# Patient Record
Sex: MALE | Race: WHITE | NOT HISPANIC OR LATINO | Employment: FULL TIME | URBAN - METROPOLITAN AREA
[De-identification: names, ages, dates, MRNs, and addresses within clinical notes are randomized per-mention and may not be internally consistent; named-entity substitution may affect disease eponyms.]

---

## 2019-01-21 ENCOUNTER — TELEPHONE (OUTPATIENT)
Dept: SCHEDULING | Facility: IMAGING CENTER | Age: 24
End: 2019-01-21

## 2019-01-22 ENCOUNTER — OFFICE VISIT (OUTPATIENT)
Dept: MEDICAL GROUP | Age: 24
End: 2019-01-22
Payer: COMMERCIAL

## 2019-01-22 VITALS
BODY MASS INDEX: 25.87 KG/M2 | DIASTOLIC BLOOD PRESSURE: 80 MMHG | HEIGHT: 73 IN | SYSTOLIC BLOOD PRESSURE: 130 MMHG | TEMPERATURE: 97.4 F | WEIGHT: 195.2 LBS | HEART RATE: 67 BPM | OXYGEN SATURATION: 96 %

## 2019-01-22 DIAGNOSIS — Z23 NEED FOR VACCINATION: ICD-10-CM

## 2019-01-22 DIAGNOSIS — J45.20 MILD INTERMITTENT ASTHMA WITHOUT COMPLICATION: ICD-10-CM

## 2019-01-22 DIAGNOSIS — Z00.00 PE (PHYSICAL EXAM), ANNUAL: Primary | ICD-10-CM

## 2019-01-22 DIAGNOSIS — Z91.89 AT RISK FOR HIV DUE TO HOMOSEXUAL CONTACT: ICD-10-CM

## 2019-01-22 DIAGNOSIS — H61.23 BILATERAL IMPACTED CERUMEN: ICD-10-CM

## 2019-01-22 PROCEDURE — 90632 HEPA VACCINE ADULT IM: CPT | Performed by: FAMILY MEDICINE

## 2019-01-22 PROCEDURE — 90686 IIV4 VACC NO PRSV 0.5 ML IM: CPT | Performed by: FAMILY MEDICINE

## 2019-01-22 PROCEDURE — 90472 IMMUNIZATION ADMIN EACH ADD: CPT | Performed by: FAMILY MEDICINE

## 2019-01-22 PROCEDURE — 90471 IMMUNIZATION ADMIN: CPT | Performed by: FAMILY MEDICINE

## 2019-01-22 PROCEDURE — 99395 PREV VISIT EST AGE 18-39: CPT | Mod: 25 | Performed by: FAMILY MEDICINE

## 2019-01-22 RX ORDER — EMTRICITABINE AND TENOFOVIR DISOPROXIL FUMARATE 200; 300 MG/1; MG/1
1 TABLET, FILM COATED ORAL DAILY
Qty: 90 TAB | Refills: 1 | Status: SHIPPED | OUTPATIENT
Start: 2019-01-22 | End: 2019-05-17 | Stop reason: SDUPTHER

## 2019-01-22 ASSESSMENT — PATIENT HEALTH QUESTIONNAIRE - PHQ9: CLINICAL INTERPRETATION OF PHQ2 SCORE: 0

## 2019-01-22 NOTE — PROGRESS NOTES
CC: Establish care, annual PE    HPI:     Marcos Feldman is a 23 y.o. male, new patient to the clinic and would like to establish care.     Patient is healthy, no major medical or surgical history.  Patient denies alcohol or drug use. Patient is sexually active with males and uses condoms for protection. He is requesting information on Truvada.    Patient reports history of mild intermittent asthma. He states he was diagnosed as a child and has not had a flare-up for several years. He has several inhalers at home, but does not know when they .    Patient is requesting the influenza and HepA vaccine.    Current medicines (including changes today)  Current Outpatient Prescriptions   Medication Sig Dispense Refill   • emtricitabine-tenofovir (TRUVADA) 200-300 MG per tablet Take 1 Tab by mouth every day. 90 Tab 1   • carbamide peroxide (CARBAMOXIDE EAR DROPS) 6.5 % Solution Place 6 Drops in ear 2 times a day. 1 Bottle 1   • ALBUTEROL INH        No current facility-administered medications for this visit.      He  has a past medical history of ASTHMA.  He  has a past surgical history that includes other orthopedic surgery (Right); incision and drainage orthopedic (4/15/08); incision and drainage orthopedic (08); irrigation & debridement ortho (08); pin insertion (08); and wound closure ortho (08).  Social History   Substance Use Topics   • Smoking status: Never Smoker   • Smokeless tobacco: Never Used   • Alcohol use No     Social History     Social History Narrative   • No narrative on file     Family History   Problem Relation Age of Onset   • Hypertension Mother    • Alcohol/Drug Mother    • Hypertension Father    • Alcohol/Drug Father    • Arthritis Father    • Arthritis Paternal Grandmother      No family status information on file.       I personally reviewed patient's problem list, allergies, medications, family hx, social hx with patient and update EPIC.     REVIEW OF  "SYSTEMS:  CONSTITUTIONAL:  Denies night sweats, fatigue, malaise, lethargy, fever or chills.  RESPIRATORY:  Denies cough, wheeze, hemoptysis, or shortness of breath.  CARDIOVASCULAR:  Denies chest pains, palpitations, pedal edema  GASTROINTESTINAL:  Denies abdominal pain, nausea or vomiting, diarrhea, constipation, hematemesis, hematochezia, melena.  GENITOURINARY:  Denies urinary urgency, frequency, dysuria, or hematuria.  No obstructive symptoms.  Denies unusual discharge.    All other systems reviewed and are negative     Objective:     Blood pressure 130/80, pulse 67, temperature 36.3 °C (97.4 °F), temperature source Temporal, height 1.854 m (6' 1\"), weight 88.5 kg (195 lb 3.2 oz), SpO2 96 %. Body mass index is 25.75 kg/m².  Physical Exam:    Constitutional: Awake, alert, in no apparent distress.  Skin: Warm, dry, good turgor, no rashes/jaundice in visible areas.  ENMT: Moderate bilateral cerumen impaction, nasal & oral mucosa wnl, lips without lesions, good dentition, oropharynx clear.  Neck: Trachea midline, no masses, no thyromegaly. No cervical or supraclavicular lymphadenopathy.  Respiratory: Unlabored respiratory effort, lungs clear to auscultation, no wheezes, no rales.  Cardiovascular: Normal S1, S2, no murmur, no rubs, no gallops, no pedal edema.  Psych: Alert and oriented x3, affect and mood wnl, intact judgement and insight.       Assessment and Plan:   The following treatment plan was discussed    1. Mild intermittent asthma without complication  Chronic, diagnosed during childhood, under excellent control with albuterol inhaler as needed.  Patient denies any active respiratory symptoms today.  -Continue albuterol as needed for shortness of breath    2. At risk for HIV due to homosexual contact  Patient is healthy, sexually active, homosexual.  Patient was recently tested negative for HIV, syphilis, chlamydia, gonorrhea.  Patient does not have stable sex partner at this time.  He request Truvada for " HIV prophylaxis.  Plans:  - emtricitabine-tenofovir (TRUVADA) 200-300 MG per tablet; Take 1 Tab by mouth every day.  Dispense: 90 Tab; Refill: 1  -Side effects and the need routine monitoring discussed with patient  - COMP METABOLIC PANEL; Future  - CBC WITH DIFFERENTIAL; Future  - HIV AG/AB COMBO ASSAY SCREENING; Future  - T.PALLIDUM AB EIA; Future  - CHLAMYDIA/GC PCR URINE OR SWAB; Future  -Safe sex counseling provided  -Follow-up in 3 months    3. Bilateral impacted cerumen  Exam was notable for moderate bilateral cerumen impaction.  Patient declined ear lavage.  He denies any problems with hearing.  Plans:  - carbamide peroxide (CARBAMOXIDE EAR DROPS) 6.5 % Solution; Place 6 Drops in ear 2 times a day.  Dispense: 1 Bottle; Refill: 1    4. Need for vaccination  - Influenza Vaccine Quad Injection >3Y (PF)  - Hepatitis A Vaccine Adult IM    5. Annual PE   - pt is counseled on diet, exercise, vitamin supplement, mental health, sleep, stress  - discussed preventive cares and vaccine recommendations.      Viri Hong M.D.    Records requested.  Followup: Return in about 3 months (around 4/22/2019) for Prep therapy monitor.    Please note that this dictation was created using voice recognition software and/or scribes. I have made every reasonable attempt to correct obvious errors, but I expect that there are errors of grammar and possibly content that I did not discover before finalizing the note.     Andre WALLACE (Arturo), am scribing for, and in the presence of, Viri Hong M.D.    Electronically signed by: Andre Barnes (Arturo), 1/22/2019    Viri WALLACE M.D. personally performed the services described in this documentation, as scribed by Andre Barnes in my presence, and it is both accurate and complete.

## 2019-01-25 ENCOUNTER — TELEPHONE (OUTPATIENT)
Dept: MEDICAL GROUP | Age: 24
End: 2019-01-25

## 2019-01-26 NOTE — TELEPHONE ENCOUNTER
Phone Number Called: [facility] pharmacy    Message: called in pt medication to [facility] [place], [place]    Left Message for patient to call back: no

## 2019-05-07 ENCOUNTER — TELEPHONE (OUTPATIENT)
Dept: MEDICAL GROUP | Age: 24
End: 2019-05-07

## 2019-05-07 NOTE — TELEPHONE ENCOUNTER
"Phone Number Called: [phone] (home)       Message: Left VM for Pt to contact office.     Pt needs to be notified of provider's note:  \"Please advise pt that he was tested negative for HIV, syphilis, chlamydia, gonorrhea.\"    Left Message for patient to call back: yes        "

## 2019-05-07 NOTE — TELEPHONE ENCOUNTER
Phone Number Called: [phone] (home)       Message: informed patient    Left Message for patient to call back: no

## 2019-05-17 ENCOUNTER — OFFICE VISIT (OUTPATIENT)
Dept: MEDICAL GROUP | Age: 24
End: 2019-05-17
Payer: COMMERCIAL

## 2019-05-17 VITALS
HEIGHT: 73 IN | DIASTOLIC BLOOD PRESSURE: 66 MMHG | HEART RATE: 54 BPM | TEMPERATURE: 98.4 F | SYSTOLIC BLOOD PRESSURE: 112 MMHG | OXYGEN SATURATION: 95 % | BODY MASS INDEX: 25.18 KG/M2 | WEIGHT: 190 LBS

## 2019-05-17 DIAGNOSIS — Z91.89 AT RISK FOR HIV DUE TO HOMOSEXUAL CONTACT: Primary | ICD-10-CM

## 2019-05-17 DIAGNOSIS — J45.20 MILD INTERMITTENT ASTHMA WITHOUT COMPLICATION: ICD-10-CM

## 2019-05-17 DIAGNOSIS — H61.23 BILATERAL IMPACTED CERUMEN: ICD-10-CM

## 2019-05-17 DIAGNOSIS — Z23 NEED FOR VACCINATION: ICD-10-CM

## 2019-05-17 PROCEDURE — 99214 OFFICE O/P EST MOD 30 MIN: CPT | Mod: 25 | Performed by: FAMILY MEDICINE

## 2019-05-17 PROCEDURE — 90471 IMMUNIZATION ADMIN: CPT | Performed by: FAMILY MEDICINE

## 2019-05-17 PROCEDURE — 90732 PPSV23 VACC 2 YRS+ SUBQ/IM: CPT | Performed by: FAMILY MEDICINE

## 2019-05-17 RX ORDER — ALBUTEROL SULFATE 90 UG/1
2 AEROSOL, METERED RESPIRATORY (INHALATION) EVERY 6 HOURS PRN
Qty: 8.5 G | Refills: 5 | Status: ON HOLD | OUTPATIENT
Start: 2019-05-17 | End: 2023-03-07

## 2019-05-17 RX ORDER — EMTRICITABINE AND TENOFOVIR DISOPROXIL FUMARATE 200; 300 MG/1; MG/1
1 TABLET, FILM COATED ORAL DAILY
Qty: 90 TAB | Refills: 1 | Status: SHIPPED | OUTPATIENT
Start: 2019-05-17 | End: 2019-11-22 | Stop reason: SDUPTHER

## 2019-05-17 NOTE — PROGRESS NOTES
Subjective:   CC: Truvada med review     HPI:     Marcos Feldman is a 24 y.o. male who is an established patient of the clinic, presents with the following concerns:     Overall, the patient is in good health with no acute complaints or concerns. He was started on Truvada during his last office visit on 01/22/19. He is sexually active with a male partner and wants to be on Truvada for HIV Prep therapy. He is tolerating the medication well and denies any acute complaints. He presents today to review the results of his most recent labs dated 01/16/19. Kidney and liver function are normal. He tested negative for HIV, syphilis, chlamydia and gonorrhea. No acute complaints of penile pain, penile discharge or urinary symptoms.    During his last office visit, he was found to have moderate bilateral cerumen impaction. He has applied Carbamide peroxide solution intermittently. He denies any ear pain, ear fullness or decreased hearing.    Prior history of asthma which was diagnosed during childhood. Asthma is under excellent control with an Albuterol inhaler as needed. He denies any active respiratory symptoms today. He does require a refill of the inhaler.    Health maintenance reviewed. He is due for the pneumonia vaccine.       Current medicines (including changes today)  Current Outpatient Prescriptions   Medication Sig Dispense Refill   • emtricitabine-tenofovir (TRUVADA) 200-300 MG per tablet Take 1 Tab by mouth every day. 90 Tab 1   • albuterol (VENTOLIN HFA) 108 (90 Base) MCG/ACT Aero Soln inhalation aerosol Inhale 2 Puffs by mouth every 6 hours as needed for Shortness of Breath. 8.5 g 5     No current facility-administered medications for this visit.      He  has a past medical history of ASTHMA.    I personally reviewed patient's problem list, allergies, medications, family hx, social hx with patient and update EPIC.     REVIEW OF SYSTEMS:  CONSTITUTIONAL:  Denies night sweats, fatigue, malaise, lethargy, fever  "or chills.  RESPIRATORY:  Denies cough, wheeze, hemoptysis or shortness of breath.  CARDIOVASCULAR:  Denies chest pains, palpitations or pedal edema.  : Denies penile pain, penile discharge or urinary symptoms.    See HPI for further details.         Objective:     /66 (BP Location: Right arm, Patient Position: Sitting, BP Cuff Size: Adult)   Pulse (!) 54   Temp 36.9 °C (98.4 °F) (Temporal)   Ht 1.854 m (6' 1\")   Wt 86.2 kg (190 lb)   SpO2 95%  Body mass index is 25.07 kg/m².    Physical Exam:  Constitutional: awake, alert, in no distress.  Skin: Warm, dry, good turgor, no rashes, bruises, ulcers in visible areas.  Eye: conjunctiva clear, lids neg for edema or lesions.  ENMT: moderate cerumen impaction bilaterally. Oral and nasal mucosa wnl. Lips without lesions, good dentition, oropharynx clear.  Neck: Trachea midline, no masses, no thyromegaly. No cervical or supraclavicular lymphadenopathy  Respiratory: Unlabored respiratory effort, lungs clear to auscultation, no wheezes, no rales.  Cardiovascular: Normal S1, S2, no murmur, no pedal edema.  Psych: Oriented x3, affect and mood wnl, intact judgement and insight.       Assessment and Plan:   The following treatment plan was discussed:    1. Need for vaccination  - Pneumococal Polysaccharide Vaccine 23-Valent =>1yo SQ/IM    2. At risk for HIV due to homosexual contact  - emtricitabine-tenofovir (TRUVADA) 200-300 MG per tablet; Take 1 Tab by mouth every day.  Dispense: 90 Tab; Refill: 1  - HIV AG/AB COMBO ASSAY SCREENING; Future  - T.PALLIDUM AB EIA; Future  - Chlamydia/GC PCR Urine Or Swab; Future  - CBC WITH DIFFERENTIAL; Future  - Comp Metabolic Panel; Future    3. Bilateral impacted cerumen  - ear lavage by MA    4. Mild intermittent asthma without complication  Chronic, controlled with albuterol PRN, will continue   - albuterol (VENTOLIN HFA) 108 (90 Base) MCG/ACT Aero Soln inhalation aerosol; Inhale 2 Puffs by mouth every 6 hours as needed for " Shortness of Breath.  Dispense: 8.5 g; Refill: 5     Viri Hong M.D.    Follow up: Return in about 6 months (around 11/17/2019) for Multiple issues.    Please note that this dictation was created using voice recognition software and/or scribes. I have made every reasonable attempt to correct obvious errors, but I expect that there are errors of grammar and possibly content that I did not discover before finalizing the note.     IJyotsna (Scribe), am scribing for, and in the presence of, Viri Hong M.D.    Electronically signed by: Jyotsna Miller (Scribe), 5/17/2019    Viri WALLACE M.D. personally performed the services described in this documentation, as scribed by Jyotsna Miller in my presence, and it is both accurate and complete.

## 2019-05-23 ENCOUNTER — OCCUPATIONAL MEDICINE (OUTPATIENT)
Dept: OCCUPATIONAL MEDICINE | Facility: CLINIC | Age: 24
End: 2019-05-23
Payer: COMMERCIAL

## 2019-05-23 VITALS
TEMPERATURE: 97.6 F | BODY MASS INDEX: 25.18 KG/M2 | WEIGHT: 190 LBS | HEART RATE: 52 BPM | OXYGEN SATURATION: 100 % | HEIGHT: 73 IN | SYSTOLIC BLOOD PRESSURE: 120 MMHG | RESPIRATION RATE: 12 BRPM | DIASTOLIC BLOOD PRESSURE: 70 MMHG

## 2019-05-23 DIAGNOSIS — Z02.1 PRE-EMPLOYMENT DRUG SCREENING: ICD-10-CM

## 2019-05-23 DIAGNOSIS — Z77.098 EXPOSURE TO HAZARDOUS CHEMICAL: Primary | ICD-10-CM

## 2019-05-23 LAB
AMP AMPHETAMINE: NORMAL
BAR BARBITURATES: NORMAL
BZO BENZODIAZEPINES: NORMAL
COC COCAINE: NORMAL
INT CON NEG: NORMAL
INT CON POS: NORMAL
MDMA ECSTASY: NORMAL
MET METHAMPHETAMINES: NORMAL
MTD METHADONE: NORMAL
OPI OPIATES: NORMAL
OXY OXYCODONE: NORMAL
PCP PHENCYCLIDINE: NORMAL
POC URINE DRUG SCREEN OCDRS: NORMAL
THC: NORMAL

## 2019-05-23 PROCEDURE — 80305 DRUG TEST PRSMV DIR OPT OBS: CPT | Performed by: PREVENTIVE MEDICINE

## 2019-05-23 PROCEDURE — 99202 OFFICE O/P NEW SF 15 MIN: CPT | Performed by: PREVENTIVE MEDICINE

## 2019-05-23 ASSESSMENT — PAIN SCALES - GENERAL: PAINLEVEL: 4=SLIGHT-MODERATE PAIN

## 2019-05-23 NOTE — LETTER
[address],   [address] [place] [address]  Phone:  [phone] - Fax:  [phone]   Occupational Health [facility] Progress Report and Disability Certification  Date of Service: 5/23/2019   No Show:  No  Date / Time of Next Visit:  Release from care   Claim Information   Patient Name: Marcos Feldman  Claim Number:     Employer: [employer]  Date of Injury: 5/22/2019     Insurer / TPA: Fawn Goodwin  ID / SSN:     Occupation:   Diagnosis: The primary encounter diagnosis was Exposure to hazardous chemical. A diagnosis of Pre-employment drug screening was also pertinent to this visit.    Medical Information   Related to Industrial Injury?   Comments:Indeterminant    Subjective Complaints:  DOI 5/22/2019: 23 yo male presents with chemical exposure to the mouth.  Patient states he was at work and later on the day noted swelling and blistering of the tongue.  He notes some stinging sensation.  He went home and took Benadryl and ibuprofen.  The swelling and pain has reduced but has not subsided completely.  He denies history of allergies, eczema.  Denies shortness of breath, chest pain, sore throat, other symptoms.  He states he did touch some epoxy at work and is unsure if he may have touched his mouth afterward. He states he reported his symptoms at work and they advised him to present to the clinic.   Objective Findings: HEENT: Few diffuse small vesicles on the tongue, no erythema or swelling noted.  No tenderness.  No open wounds or lesions.   Pre-Existing Condition(s):     Assessment:   Initial Visit    Status: Discharged /  MMI  Permanent Disability:No    Plan:      Diagnostics:      Comments:  At this point hard to pinpoint exact cause of this patient's symptoms.  Epoxies are known to cause allergic reactions but has easily could have been other exposures.  With symptoms improving at this point no need for further  intervention  Released [facility] care  Full duty  Follow-up as needed    Disability Information   Status: Released to Full Duty    From:  5/23/2019  Through:   Restrictions are:     Physical Restrictions   Sitting:    Standing:    Stooping:    Bending:      Squatting:    Walking:    Climbing:    Pushing:      Pulling:    Other:    Reaching Above Shoulder (L):   Reaching Above Shoulder (R):       Reaching Below Shoulder (L):    Reaching Below Shoulder (R):      Not to exceed Weight Limits   Carrying(hrs):   Weight Limit(lb):   Lifting(hrs):   Weight  Limit(lb):     Comments:      Repetitive Actions   Hands: i.e. Fine Manipulations from Grasping:     Feet: i.e. Operating Foot Controls:     Driving / Operate Machinery:     Physician Name: Gerry Peters D.O. Physician Signature: GERRY Fang D.O. e-Signature: Dr. Yaron Tan, Medical Director   Clinic Name / Location: [address],   [address] [place] [id number] Clinic Phone Number: Dept: [phone]   Appointment Time: 4:15 Pm Visit Start Time: 2:49 PM   Check-In Time:  2:39 Pm Visit Discharge Time: 3:23 PM   Original-Treating Physician or Chiropractor    Page 2-Insurer/TPA    Page 3-Employer    Page 4-Employee

## 2019-05-23 NOTE — PROGRESS NOTES
"Subjective:      Marcso Feldman is a 24 y.o. male who presents with Other (NEW WC DOI 05/22/2019 - Chemical Exp/Reaction - RM 16)      DOI 5/22/2019: 23 yo male presents with chemical exposure to the mouth.  Patient states he was at work and later on the day noted swelling and blistering of the tongue.  He notes some stinging sensation.  He went home and took Benadryl and ibuprofen.  The swelling and pain has reduced but has not subsided completely.  He denies history of allergies, eczema.  Denies shortness of breath, chest pain, sore throat, other symptoms.  He states he did touch some epoxy at work and is unsure if he may have touched his mouth afterward. He states he reported his symptoms at work and they advised him to present to the clinic.     HPI    ROS  ROS: All systems were reviewed on intake form, form was reviewed and signed. See scanned documents in media. Pertinent positives and negatives included in HPI.    PMH: No pertinent past medical history to this problem  MEDS: Medications were reviewed in Epic  ALLERGIES:   Allergies   Allergen Reactions   • No Known Drug Allergy      SOCHX: Works as a  at [employer]  FH: No pertinent family history to this problem     Objective:     /70   Pulse (!) 52   Temp 36.4 °C (97.6 °F) (Temporal)   Resp 12   Ht 1.854 m (6' 1\")   Wt 86.2 kg (190 lb)   SpO2 100%   BMI 25.07 kg/m²      Physical Exam   Constitutional: He is oriented to person, place, and time. He appears well-developed and well-nourished.   Cardiovascular: Normal rate.    Pulmonary/Chest: Effort normal.   Neurological: He is alert and oriented to person, place, and time.   Skin: Skin is warm and dry.   Psychiatric: He has a normal mood and affect. His behavior is normal. Judgment normal.       HEENT: Few diffuse small vesicles on the tongue, no erythema or swelling noted.  No tenderness.  No open wounds or lesions.       Assessment/Plan:     1. Exposure to hazardous " chemical      At this point hard to pinpoint exact cause of this patient's symptoms.  Epoxies are known to cause allergic reactions but has easily could have been other exposures.   With symptoms improving at this point no need for further intervention   Released from care   Full duty   Follow-up as needed

## 2019-05-23 NOTE — LETTER
"EMPLOYEE’S CLAIM FOR COMPENSATION/ REPORT OF INITIAL TREATMENT  FORM C-4    EMPLOYEE’S CLAIM - PROVIDE ALL INFORMATION REQUESTED   First Name  Marcos Last Name  Gaston Birthdate                    1995                Sex  male Claim Number   Home Address  [address] Age  24 y.o. Height  1.854 m (6' 1\") Weight  86.2 kg (190 lb) [facility]     [facility] [place]  [address] Telephone  [phone] (home)    Mailing Address  [address] [facility] Zip  [address] Primary Language Spoken  English    Insurer  UNKNOWN Third Party   Fawn Goodwin   Employee's Occupation (Job Title) When Injury or Occupational Disease Occurred      Employer's Name  [employer]  Telephone  [phone]    Employer Address  [address]  [facility]  [address]    Date of Injury  5/22/2019               Hour of Injury  11:45 AM Date Employer Notified  5/23/2019 Last Day of Work after Injury or Occupational Disease  5/23/2019 Supervisor to Whom Injury Reported  SAMIR CUNNINGHAM   Address or Location of Accident (if applicable)  [[address] [place] [address]]   What were you doing at the time of accident? (if applicable)  Dropping off supplies    How did this injury or occupational disease occur? (Be specific an answer in detail. Use additional sheet if necessary)  I Believe I touched an epoxt floor, and the touched my mouth   If you believe that you have an occupational disease, when did you first have knowledge of the disability and it relationship to your employment?  N/A Witnesses to the Accident  None      Nature of Injury or Occupational Disease  Poisoning - Chemical (Other than metals)  Part(s) of Body Injured or Affected  Mouth, ,     I certify that the above is true and correct to the best of my knowledge and that I have provided this information in order to obtain the " benefits of [place]  I hereby authorize any physician, chiropractor, surgeon, practitioner, or other person, any hospital, including [facility] or [facility], any medical service organization, any insurance company, or other institution or organization to release to each other, any medical or other information, including benefits paid or payable, pertinent to this injury or disease, except information relative to diagnosis, treatment and/or counseling for AIDS, psychological conditions, alcohol or controlled substances, for which I must give specific authorization.  A Photostat of this authorization shall be as valid as the original.     Date   Place   Employee’s Signature   THIS REPORT MUST BE COMPLETED AND MAILED WITHIN 3 WORKING DAYS OF TREATMENT   Place  [facility]  Name of [facility]   Date  5/23/2019 Diagnosis  (Z77.098) Exposure to hazardous chemical  (primary encounter diagnosis)  (Z02.1) Pre-employment drug screening Is there evidence the injured employee was under the influence of alcohol and/or another controlled substance at the time of accident?   Hour  2:49 PM Description of Injury or Disease  The primary encounter diagnosis was Exposure to hazardous chemical. A diagnosis of Pre-employment drug screening was also pertinent to this visit. No   Treatment  OTC ibuprofen  Have you advised the patient to remain off work five days or more? No   X-Ray Findings      If Yes   From Date  To Date      From information given by the employee, together with medical evidence, can you directly connect this injury or occupational disease as job incurred?    Comments:Indeterminate If No Full Duty  Yes Modified Duty      Is additional medical care by a physician indicated?  No If Modified Duty, Specify any Limitations / Restrictions      Do you know of any previous  "injury or disease contributing to this condition or occupational disease?                                Date  5/23/2019 Print Doctor’s Name Gerry Peters D.O. I certify the employer’s copy of  this form was mailed on:   Address  [address],   [address] Insurer’s Use Only     [facility] Zip  [address]    Provider’s Tax ID Number  [id number] Telephone  Dept: [phone]        e-SignTAYLOR, GERRY AGUIRRE D.O.   e-Signature: Dr. Yaron Tan, Medical Director Degree  DO        ORIGINAL-TREATING PHYSICIAN OR CHIROPRACTOR    PAGE 2-INSURER/TPA    PAGE 3-EMPLOYER    PAGE 4-EMPLOYEE             Form C-4 (rev10/07)              BRIEF DESCRIPTION OF RIGHTS AND BENEFITS  (Pursuant to [place] 616C.050)    Notice of Injury or Occupational Disease (Incident Report Form C-1): If an injury or occupational disease (OD) arises out of and in the  course of employment, you must provide written notice to your employer as soon as practicable, but no later than 7 days after the accident or  OD. Your employer shall maintain a sufficient supply of the required forms.    Claim for Compensation (Form C-4): If medical treatment is sought, the form C-4 is available at the place of initial treatment. A completed  \"Claim for Compensation\" (Form C-4) must be filed within 90 days after an accident or OD. The treating physician or chiropractor must,  within 3 working days after treatment, complete and mail to the employer, the employer's insurer and third-party , the Claim for  Compensation.    Medical Treatment: If you require medical treatment for your on-the-job injury or OD, you may be required to select a physician or  chiropractor from a list provided by your workers’ compensation insurer, if it has contracted with an Organization for Managed Care (MCO) or  Preferred Provider Organization (PPO) or providers of health care. If your employer has not entered into a contract with an MCO or PPO, you  may select a physician or " chiropractor from the Panel of Physicians and Chiropractors. Any medical costs related to your industrial injury or  OD will be paid by your insurer.    Temporary Total Disability (TTD): If your doctor has certified that you are unable to work for a period of at least 5 consecutive days, or 5  cumulative days in a 20-day period, or places restrictions on you that your employer does not accommodate, you may be entitled to TTD  compensation.    Temporary Partial Disability (TPD): If the wage you receive upon reemployment is less than the compensation for TTD to which you are  entitled, the insurer may be required to pay you TPD compensation to make up the difference. TPD can only be paid for a maximum of 24  months.    Permanent Partial Disability (PPD): When your medical condition is stable and there is an indication of a PPD as a result of your injury or  OD, within 30 days, your insurer must arrange for an evaluation by a rating physician or chiropractor to determine the degree of your PPD. The  amount of your PPD award depends on the date of injury, the results of the PPD evaluation and your age and wage.    Permanent Total Disability (PTD): If you are medically certified by a treating physician or chiropractor as permanently and totally disabled  and have been granted a PTD status by your insurer, you are entitled to receive monthly benefits not to exceed 66 2/3% of your average  monthly wage. The amount of your PTD payments is subject to reduction if you previously received a PPD award.    Vocational Rehabilitation Services: You may be eligible for vocational rehabilitation services if you are unable to return to the job due to a  permanent physical impairment or permanent restrictions as a result of your injury or occupational disease.    Transportation and Per Silke Reimbursement: You may be eligible for travel expenses and per silke associated with medical treatment.    Reopening: You may be able to reopen your  claim if your condition worsens after claim closure.    Appeal Process: If you disagree with a written determination issued by the insurer or the insurer does not respond to your request, you may  appeal to the Department of Administration, , by following the instructions contained in your determination letter. You must  appeal the determination within 70 days from the date of the determination letter at [address], [address], [place], [place]  [address], or [address], [place], [place] [address]. If you disagree with the  decision, you may appeal to the  Department of Administration, . You must file your appeal within 30 days from the date of the  decision  letter at [address], [address], [place], [place] [address], or [address] [facility] [address], [place], [place] [address]. If you  disagree with a decision of an , you may file a petition for judicial review with the District Court. You must do so within 30  days of the Appeal Officer’s decision. You may be represented by an  at your own expense or you may contact the [facility] for possible  representation.    [place]  for Injured Workers (NAIW): If you disagree with a  decision, you may request that NAIW represent you  without charge at an  Hearing. For information regarding denial of benefits, you may contact the [facility] at: [address], [place] [address], [phone], or [address], [place] [address], [phone]    To File a Complaint with the Division: If you wish to file a complaint with the  of the Division of Industrial Relations (DIR),  please contact the Workers’ Compensation Section, [address], [address], [place], [place] [address], telephone [phone], or  [address], [place]  [address], telephone [phone].    For assistance with Workers’ Compensation Issues: you may contact the Office of the Governor Consumer Health Assistance, [address] [place] [address], [facility] [address], [place] [address], Toll Free 1-573.350.6926, Web site: http://[employer].[place]., E-mail  [email or web address].[place].                                                                                                                                                                                                                                   __________________________________________________________________                                                                   _________________                Employee Name / Signature                                                                                                                                                       Date                                                                                                                                                                                                     D-2 (rev. 10/07)

## 2019-11-12 DIAGNOSIS — Z91.89 AT RISK FOR HIV DUE TO HOMOSEXUAL CONTACT: ICD-10-CM

## 2019-11-22 ENCOUNTER — OFFICE VISIT (OUTPATIENT)
Dept: MEDICAL GROUP | Age: 24
End: 2019-11-22
Payer: COMMERCIAL

## 2019-11-22 VITALS
HEART RATE: 56 BPM | TEMPERATURE: 97.6 F | HEIGHT: 73 IN | OXYGEN SATURATION: 97 % | SYSTOLIC BLOOD PRESSURE: 112 MMHG | BODY MASS INDEX: 25.05 KG/M2 | WEIGHT: 189 LBS | DIASTOLIC BLOOD PRESSURE: 68 MMHG

## 2019-11-22 DIAGNOSIS — J45.20 MILD INTERMITTENT ASTHMA WITHOUT COMPLICATION: ICD-10-CM

## 2019-11-22 DIAGNOSIS — H61.23 BILATERAL IMPACTED CERUMEN: ICD-10-CM

## 2019-11-22 DIAGNOSIS — Z23 NEED FOR VACCINATION: ICD-10-CM

## 2019-11-22 DIAGNOSIS — Z91.89 AT RISK FOR HIV DUE TO HOMOSEXUAL CONTACT: Primary | ICD-10-CM

## 2019-11-22 DIAGNOSIS — R79.89 ELEVATED SERUM CREATININE: ICD-10-CM

## 2019-11-22 PROCEDURE — 90471 IMMUNIZATION ADMIN: CPT | Performed by: FAMILY MEDICINE

## 2019-11-22 PROCEDURE — 99214 OFFICE O/P EST MOD 30 MIN: CPT | Mod: 25 | Performed by: FAMILY MEDICINE

## 2019-11-22 PROCEDURE — 90686 IIV4 VACC NO PRSV 0.5 ML IM: CPT | Performed by: FAMILY MEDICINE

## 2019-11-22 PROCEDURE — 69210 REMOVE IMPACTED EAR WAX UNI: CPT | Performed by: FAMILY MEDICINE

## 2019-11-22 RX ORDER — EMTRICITABINE AND TENOFOVIR DISOPROXIL FUMARATE 200; 300 MG/1; MG/1
1 TABLET, FILM COATED ORAL DAILY
Qty: 90 TAB | Refills: 1 | Status: SHIPPED | OUTPATIENT
Start: 2019-11-22 | End: 2020-05-29 | Stop reason: SDUPTHER

## 2019-11-22 ASSESSMENT — PAIN SCALES - GENERAL: PAINLEVEL: NO PAIN

## 2019-11-22 NOTE — PROGRESS NOTES
Subjective:   CC: Truvada Rx follow up     HPI:     Marcos Feldman is a 24 y.o. male who is an established patient of the clinic, presents with the following concerns:     He continues to take Truvada for HIV Prep Therapy. He continues to be sexually active with male partners and uses condoms consistnetly. He again tested negative for HIV, syphilis, chlamydia and gonorrhea per most recent blood tests. He has concerns as he notes there were recently lawsuits filed from the Department of Health and Human Services against the Truvada , [place].     His most recent lab work also showed elevated urea nitrogen at 30 and creatinine at 1.45. He notes he has a very high protein diet of about 300-350 g per day for the purpose of exercise and muscle gain. He drinks about 4-5 32- oz canteens of water per day.    On his past office visits, he was found to have moderate amounts of bilateral cerumen impaction which were removed with curettes. He has not noticed any ear pain, fullness, or decreased hearing.     Patient otherwise has a long-standing history of asthma that is well-controlled on his current regimen of albuterol. He would like a flu vaccination today.         Current medicines (including changes today)  Current Outpatient Medications   Medication Sig Dispense Refill   • emtricitabine-tenofovir (TRUVADA) 200-300 MG per tablet Take 1 Tab by mouth every day. 90 Tab 1   • albuterol (VENTOLIN HFA) 108 (90 Base) MCG/ACT Aero Soln inhalation aerosol Inhale 2 Puffs by mouth every 6 hours as needed for Shortness of Breath. 8.5 g 5     No current facility-administered medications for this visit.      He  has a past medical history of ASTHMA.    I personally reviewed patient's problem list, allergies, medications, family hx, social hx with patient and update EPIC.     REVIEW OF SYSTEMS:  CONSTITUTIONAL:  Denies night sweats, fatigue, malaise, lethargy, fever or chills.  RESPIRATORY:  Denies cough, wheeze, hemoptysis,  "or shortness of breath.  CARDIOVASCULAR:  Denies chest pains, palpitations, pedal edema     Objective:     /68 (BP Location: Left arm, Patient Position: Sitting, BP Cuff Size: Adult)   Pulse (!) 56   Temp 36.4 °C (97.6 °F)   Ht 1.854 m (6' 1\")   Wt 85.7 kg (189 lb)   SpO2 97%  Body mass index is 24.94 kg/m².    Physical Exam:  Constitutional: awake, alert, in no distress.  Skin: Warm, dry, good turgor, no rashes, bruises, ulcers in visible areas.  Eye: conjunctiva clear, lids neg for edema or lesions.  ENMT: Oral and nasal mucosa wnl. Lips without lesions, good dentition, oropharynx clear.  - Moderate cerumen impaction bilaterally.  Neck: Trachea midline, no masses, no thyromegaly. No cervical or supraclavicular lymphadenopathy  Respiratory: Unlabored respiratory effort, lungs clear to auscultation, no wheezes, no rales.  Cardiovascular: Normal S1, S2, no murmur, no pedal edema.  Psych: Oriented x3, affect and mood wnl, intact judgement and insight.       Assessment and Plan:   The following treatment plan was discussed    1. Elevated serum creatinine  Recent blood tests were notable for acute elevated BUN, elevated serum CR, and decreased GFR. Per patient, he is on very high protein diet to help with improving muscle mass with exercises. He is consuming 300-350 gram of protein daily. This likely contributes to abnormal lab findings. Pt denies hx of CKD, hx of excessive consumption of NSAID. He drinks copious amount of water daily. Will continue to monitor.   - Basic Metabolic Panel; Future  - Cystatin C w/rflx eGFR; Future    2. At risk for HIV due to homosexual contact  Pt is homosexual, currently not in stable relationship. He is sexually active. He uses condoms consistently. He has been on Truvada for HIV prophylaxis and tolerates it well. Recent blood tests were negative for HIV, syphilis, Chlamydia, and gonorrhea. He feels well overall. As with above, recent blood tests showed elevated BUN, elevated " serum CR, and decreased GFR, likely secondary to high protein diet.   - emtricitabine-tenofovir (TRUVADA) 200-300 MG per tablet; Take 1 Tab by mouth every day.  Dispense: 90 Tab; Refill: 1  - Chlamydia/GC PCR Urine Or Swab; Future  - HIV AG/AB COMBO ASSAY SCREENING; Future  - T.PALLIDUM AB EIA; Future  - Comp Metabolic Panel; Future  - CBC WITH DIFFERENTIAL; Future    3. Need for vaccination  - Influenza Vaccine Quad Injection (PF)    4. Mild intermittent asthma without complication  Chronic, controlled with Albuterol PRN, no s/e reported, will continue.       5. Bilateral impacted cerumen  Exam was notable for moderate cerumen impaction bilaterally.   - curettage     Procedure note: ear wax removal.   I personally removed ear wax from both ears using a lighted curette pt tolerated the procedure well, no immediate complication noted.          Viri Hong M.D.    Followup: Return in about 6 months (around 5/22/2020) for annual PE.    Please note that this dictation was created using voice recognition software and/or scribes. I have made every reasonable attempt to correct obvious errors, but I expect that there are errors of grammar and possibly content that I did not discover before finalizing the note.     IJose (Scribe), am scribing for, and in the presence of, Viri Hong M.D.    Electronically signed by: Jose Montoya (Patrciiaibe), 11/22/2019    IViri M.D. personally performed the services described in this documentation, as scribed by Jose Montoya in my presence, and it is both accurate and complete.

## 2020-03-11 ENCOUNTER — OFFICE VISIT (OUTPATIENT)
Dept: MEDICAL GROUP | Age: 25
End: 2020-03-11
Payer: COMMERCIAL

## 2020-03-11 VITALS
DIASTOLIC BLOOD PRESSURE: 60 MMHG | HEART RATE: 58 BPM | WEIGHT: 186 LBS | HEIGHT: 73 IN | TEMPERATURE: 97.9 F | SYSTOLIC BLOOD PRESSURE: 130 MMHG | OXYGEN SATURATION: 96 % | BODY MASS INDEX: 24.65 KG/M2

## 2020-03-11 DIAGNOSIS — K92.1 BLOOD IN STOOL: ICD-10-CM

## 2020-03-11 PROBLEM — Z00.00 ENCOUNTER FOR MEDICAL EXAMINATION TO ESTABLISH CARE: Status: ACTIVE | Noted: 2020-03-11

## 2020-03-11 PROCEDURE — 99214 OFFICE O/P EST MOD 30 MIN: CPT | Performed by: FAMILY MEDICINE

## 2020-03-11 RX ORDER — DOCUSATE SODIUM 100 MG/1
100 CAPSULE, LIQUID FILLED ORAL 2 TIMES DAILY
Qty: 60 CAP | Refills: 2 | Status: SHIPPED
Start: 2020-03-11 | End: 2020-07-07

## 2020-03-11 RX ORDER — HYDROCORTISONE ACETATE 25 MG/1
25 SUPPOSITORY RECTAL EVERY 12 HOURS
Qty: 12 SUPPOSITORY | Refills: 2 | Status: SHIPPED | OUTPATIENT
Start: 2020-03-11 | End: 2020-05-26 | Stop reason: SDUPTHER

## 2020-03-11 ASSESSMENT — PATIENT HEALTH QUESTIONNAIRE - PHQ9: CLINICAL INTERPRETATION OF PHQ2 SCORE: 0

## 2020-03-11 NOTE — PROGRESS NOTES
This medical record contains text that has been entered with the assistance of computer voice recognition and dictation software.  Therefore, it may contain unintended errors in text, spelling, punctuation, or grammar    Chief Complaint   Patient presents with   • Establish Care   • GI Problem     x 3 months          Marcos Feldman is a 24 y.o. male here evaluation and management of: Blood in stool       HPI:     1. Blood in stool    NEW PROBLEM    He presents today with an acute complaint of GI problems onset 3 months. He admits to associated symptoms and bloating constipation. He adds that over the past couple weeks, he has noticed rectal pain and bloody stools. He believes he may have hemorrhoids.  The patient states that he has been having sporadic bowel movements not consistent maybe 2 or 3 every week, he has increased exercise and used Preparation H with mild alleviation of his symptoms.  The patient denies any abdominal pain no nausea no vomiting no fevers or chills.  Patient denies any excessive use of alcohol.    Current medicines (including changes today)  Current Outpatient Medications   Medication Sig Dispense Refill   • docusate sodium (COLACE) 100 MG Cap Take 1 Cap by mouth 2 times a day. 60 Cap 2   • Psyllium (METAMUCIL) 28 % packet Take 1 Packet by mouth 2 times a day. 60 Packet 3   • hydrocortisone (ANUSOL-HC) 25 MG Suppos Insert 1 Suppository in rectum every 12 hours. 12 Suppository 2   • nitroglycerin (NITRO-BID) 2 % Ointment Apply 0.5 Inches to skin as directed every 6 hours. 1 Tube 2   • emtricitabine-tenofovir (TRUVADA) 200-300 MG per tablet Take 1 Tab by mouth every day. 90 Tab 1   • albuterol (VENTOLIN HFA) 108 (90 Base) MCG/ACT Aero Soln inhalation aerosol Inhale 2 Puffs by mouth every 6 hours as needed for Shortness of Breath. 8.5 g 5     No current facility-administered medications for this visit.      He  has a past medical history of ASTHMA.  He  has a past surgical history that  "includes other orthopedic surgery (Right); incision and drainage orthopedic (4/15/08); incision and drainage orthopedic (08); irrigation & debridement ortho (08); pin insertion (08); and wound closure ortho (08).  Social History     Tobacco Use   • Smoking status: Never Smoker   • Smokeless tobacco: Never Used   Substance Use Topics   • Alcohol use: No   • Drug use: No     Social History     Social History Narrative   • Not on file     Family History   Problem Relation Age of Onset   • Hypertension Mother    • Alcohol/Drug Mother    • Hypertension Father    • Alcohol/Drug Father    • Arthritis Father    • Arthritis Paternal Grandmother      Family Status   Relation Name Status   • Mo  Alive   • Fa  Alive   • MGMo     • MGFa     • PGMo  Alive   • PGFa           ROS    Please see hpi     All other systems reviewed and are negative     Objective:     /60 (BP Location: Left arm, Patient Position: Sitting, BP Cuff Size: Adult)   Pulse (!) 58   Temp 36.6 °C (97.9 °F) (Temporal)   Ht 1.854 m (6' 1\")   Wt 84.4 kg (186 lb)   SpO2 96%  Body mass index is 24.54 kg/m².  Physical Exam:    Constitutional: Alert, no distress.  Skin: Normal  Eye: Equal, round and reactive, conjunctiva clear, lids normal.  ENMT: Lips without lesions, good dentition, oropharynx clear.  Neck: Trachea midline, no masses, no thyromegaly. No cervical or supraclavicular lymphadenopathy.  Respiratory: Unlabored respiratory effort, lungs clear to auscultation, no wheezes, no ronchi.  Cardiovascular: Normal S1, S2, no murmur, no edema.  Abdomen: Soft, non-tender, no masses, no hepatosplenomegaly.  Psych: Alert and oriented x3, normal affect and mood.  Rectal--deferred    Assessment and Plan:   The following treatment plan was discussed      1. Blood in stool    He was prescribed docusate sodium 100 MG Cap BID, Psyllium 28 % packet BID, hydrocortisone  25 MG Suppository,  Insert 1 Suppository in rectum every " 12 hours, and nitroglycerin 2 % Ointment; Apply 0.5 Inches to skin as directed every 6 hours.    If this fails we will proceed to a referral for hemorrhoidectomy    - docusate sodium (COLACE) 100 MG Cap; Take 1 Cap by mouth 2 times a day.  Dispense: 60 Cap; Refill: 2  - Psyllium (METAMUCIL) 28 % packet; Take 1 Packet by mouth 2 times a day.  Dispense: 60 Packet; Refill: 3  - hydrocortisone (ANUSOL-HC) 25 MG Suppos; Insert 1 Suppository in rectum every 12 hours.  Dispense: 12 Suppository; Refill: 2  - nitroglycerin (NITRO-BID) 2 % Ointment; Apply 0.5 Inches to skin as directed every 6 hours.  Dispense: 1 Tube; Refill: 2       HEALTH MAINTENANCE:Up to date    Instructed to Follow up in clinic or ER for worsening symptoms, difficulty breathing, lack of expected recovery, or should new symptoms or problems arise.    Followup: Return in about 4 weeks (around 4/8/2020) for Reevaluation, With PCP.      Once again this medical record contains text that has been entered with the assistance of computer voice recognition, dictation software, and medical scribes.  Therefore, it may contain unintended errors in text, spelling, punctuation, or grammar.    IAaron (Scribe), am scribing for, and in the presence of, Darwin Wagner M.D.    Electronically signed by: Aaron Reilly (Patriciaiblanny), 3/11/2020     IDarwin M.D. personally performed the services described in this documentation, as scribed by Aaron Reilly in my presence, and it is both accurate and complete.

## 2020-05-26 DIAGNOSIS — Z91.89 AT RISK FOR HIV DUE TO HOMOSEXUAL CONTACT: ICD-10-CM

## 2020-05-26 DIAGNOSIS — K92.1 BLOOD IN STOOL: ICD-10-CM

## 2020-05-28 RX ORDER — HYDROCORTISONE ACETATE 25 MG/1
25 SUPPOSITORY RECTAL EVERY 12 HOURS
Qty: 12 SUPPOSITORY | Refills: 2 | Status: SHIPPED
Start: 2020-05-28 | End: 2020-07-07

## 2020-05-28 RX ORDER — DEXAMETHASONE 0.75 MG/1
1 TABLET ORAL DAILY
Qty: 90 TAB | Refills: 1 | OUTPATIENT
Start: 2020-05-28

## 2020-05-29 ENCOUNTER — OFFICE VISIT (OUTPATIENT)
Dept: MEDICAL GROUP | Age: 25
End: 2020-05-29
Payer: COMMERCIAL

## 2020-05-29 VITALS
HEART RATE: 56 BPM | OXYGEN SATURATION: 98 % | TEMPERATURE: 98.6 F | DIASTOLIC BLOOD PRESSURE: 64 MMHG | WEIGHT: 182 LBS | HEIGHT: 73 IN | SYSTOLIC BLOOD PRESSURE: 120 MMHG | BODY MASS INDEX: 24.12 KG/M2

## 2020-05-29 DIAGNOSIS — K59.00 CONSTIPATION, UNSPECIFIED CONSTIPATION TYPE: ICD-10-CM

## 2020-05-29 DIAGNOSIS — K64.9 HEMORRHOIDS, UNSPECIFIED HEMORRHOID TYPE: ICD-10-CM

## 2020-05-29 DIAGNOSIS — Z91.89 AT RISK FOR HIV DUE TO HOMOSEXUAL CONTACT: Primary | ICD-10-CM

## 2020-05-29 PROBLEM — Z00.00 ENCOUNTER FOR MEDICAL EXAMINATION TO ESTABLISH CARE: Status: RESOLVED | Noted: 2020-03-11 | Resolved: 2020-05-29

## 2020-05-29 PROBLEM — K92.1 BLOOD IN STOOL: Status: RESOLVED | Noted: 2020-03-11 | Resolved: 2020-05-29

## 2020-05-29 PROBLEM — H61.23 BILATERAL IMPACTED CERUMEN: Status: RESOLVED | Noted: 2019-01-22 | Resolved: 2020-05-29

## 2020-05-29 PROCEDURE — 99214 OFFICE O/P EST MOD 30 MIN: CPT | Performed by: FAMILY MEDICINE

## 2020-05-29 RX ORDER — DEXAMETHASONE 0.75 MG/1
1 TABLET ORAL DAILY
Qty: 90 TAB | Refills: 1 | Status: SHIPPED
Start: 2020-05-29 | End: 2020-06-01

## 2020-05-29 NOTE — PROGRESS NOTES
Subjective:   CC: hemorrhoid f/u     HPI:     Marcos Feldman is a 25 y.o. male, established patient of the clinic, presents with the following concerns:     Pt is homosexual and is currently taking Truvada daily for HIV prophylaxis. He had STD tests done last week at [facility]. The results have not been forwarded to the office. Pt tolerates Truvada well, no s/e reported.     Pt was seen by Dr. Wagner in 3/2020 for blood in stool and was diagnosed with hemorrhoid. He has been working on hydration and dietary modification for his symptoms. He states that constipation is improving. He takes Nitro-bid 2% PRN for acute symptoms.     Current medicines (including changes today)  Current Outpatient Medications   Medication Sig Dispense Refill   • emtricitabine-tenofovir (TRUVADA) 200-300 MG per tablet Take 1 Tab by mouth every day. 90 Tab 1   • nitroglycerin (NITRO-BID) 2 % Ointment Apply 1 inch intra-annally two time daily up to 2-3 weeks PRN 1 Tube 1   • hydrocortisone (ANUSOL-HC) 25 MG Suppos Insert 1 Suppository in rectum every 12 hours. 12 Suppository 2   • docusate sodium (COLACE) 100 MG Cap Take 1 Cap by mouth 2 times a day. 60 Cap 2   • Psyllium (METAMUCIL) 28 % packet Take 1 Packet by mouth 2 times a day. 60 Packet 3   • albuterol (VENTOLIN HFA) 108 (90 Base) MCG/ACT Aero Soln inhalation aerosol Inhale 2 Puffs by mouth every 6 hours as needed for Shortness of Breath. 8.5 g 5     No current facility-administered medications for this visit.      He  has a past medical history of ASTHMA.    I personally reviewed patient's problem list, allergies, medications, family hx, social hx with patient and update [facility].     REVIEW OF SYSTEMS:  CONSTITUTIONAL:  Denies night sweats, fatigue, malaise, lethargy, fever or chills.  RESPIRATORY:  Denies cough, wheeze, hemoptysis, or shortness of breath.  CARDIOVASCULAR:  Denies chest pains, palpitations, pedal edema     Objective:     /64 (BP Location: Right arm, Patient Position:  "Sitting, BP Cuff Size: Adult)   Pulse (!) 56   Temp 37 °C (98.6 °F) (Temporal)   Ht 1.854 m (6' 1\")   Wt 82.6 kg (182 lb)   SpO2 98%  Body mass index is 24.01 kg/m².    Physical Exam:  Constitutional: awake, alert, in no distress.  Skin: Warm, dry, good turgor, no rashes, bruises, ulcers in visible areas.  Eye: conjunctiva clear, lids neg for edema or lesions.  Neck: Trachea midline, no masses, no thyromegaly. No cervical or supraclavicular lymphadenopathy  Respiratory: Unlabored respiratory effort, lungs clear to auscultation, no wheezes, no rales.  Cardiovascular: Normal S1, S2, no murmur, no pedal edema.  Psych: Oriented x3, affect and mood wnl, intact judgement and insight.       Assessment and Plan:   The following treatment plan was discussed    1. At risk for HIV due to homosexual contact  - emtricitabine-tenofovir (TRUVADA) 200-300 MG per tablet; Take 1 Tab by mouth every day.  Dispense: 90 Tab; Refill: 1  - Chlamydia/GC PCR Urine Or Swab; Future  - T.PALLIDUM AB EIA; Future  - HIV AG/AB COMBO ASSAY SCREENING; Future  - CBC WITH DIFFERENTIAL; Future  - Comp Metabolic Panel; Future    2. Hemorrhoids, unspecified hemorrhoid type  - nitroglycerin (NITRO-BID) 2 % Ointment; Apply 1 inch intra-annally two time daily up to 2-3 weeks PRN  Dispense: 1 Tube; Refill: 1    3. Constipation, unspecified constipation type  - Discussed adequate hydration  - Fiber supplements: fruits/vegetable, psyllium (Metamucil, Konsyl), calcium polycarbophil (FiberCon), and methylcellulose fiber (Citrucel).  - discussed utilization of stimulant/osmotic/lubrication laxatives and stool softener PRN for symptoms.   - Enemas and suppositories can be used as needed to induce bowel movement.  - Exercise regularly, at least 4-5 days per week  - Do not ignore the urge to have bowel movement  - Discussed bowel retraining and gastrocolic reflex.         Viri Hong M.D.      Followup: Return in about 6 months (around 11/29/2020) for Multiple " issues.    Please note that this dictation was created using voice recognition software. I have made every reasonable attempt to correct obvious errors, but I expect that there are errors of grammar and possibly content that I did not discover before finalizing the note.

## 2020-05-30 DIAGNOSIS — Z91.89 AT RISK FOR HIV DUE TO HOMOSEXUAL CONTACT: ICD-10-CM

## 2020-06-01 DIAGNOSIS — Z91.89 AT RISK FOR HIV DUE TO HOMOSEXUAL CONTACT: ICD-10-CM

## 2020-06-01 DIAGNOSIS — N17.9 AKI (ACUTE KIDNEY INJURY) (HCC): ICD-10-CM

## 2020-06-02 NOTE — TELEPHONE ENCOUNTER
Pt was seen in clinic for medication refill on 5/29/2020  emtricitabine-tenofovir (TRUVADA) 200-300 MG per tablet. PT NEEDS REFILL PLEASE ADVISE.

## 2020-06-09 ENCOUNTER — PATIENT MESSAGE (OUTPATIENT)
Dept: MEDICAL GROUP | Age: 25
End: 2020-06-09

## 2020-06-09 DIAGNOSIS — K64.9 HEMORRHOIDS, UNSPECIFIED HEMORRHOID TYPE: ICD-10-CM

## 2020-06-11 ENCOUNTER — OFFICE VISIT (OUTPATIENT)
Dept: URGENT CARE | Facility: PHYSICIAN GROUP | Age: 25
End: 2020-06-11
Payer: COMMERCIAL

## 2020-06-11 VITALS
WEIGHT: 180 LBS | TEMPERATURE: 98.6 F | DIASTOLIC BLOOD PRESSURE: 72 MMHG | RESPIRATION RATE: 16 BRPM | OXYGEN SATURATION: 96 % | BODY MASS INDEX: 23.86 KG/M2 | HEIGHT: 73 IN | HEART RATE: 72 BPM | SYSTOLIC BLOOD PRESSURE: 122 MMHG

## 2020-06-11 DIAGNOSIS — K62.3 PARTIAL RECTAL PROLAPSE: ICD-10-CM

## 2020-06-11 PROCEDURE — 99214 OFFICE O/P EST MOD 30 MIN: CPT | Performed by: PHYSICIAN ASSISTANT

## 2020-06-11 NOTE — PROGRESS NOTES
"Subjective:      Marcos Feldman is a 25 y.o. male who presents with Hemorrhoids (rectum pain,constipation,severe pain,x 1 week)            HPI  25-year-old male presents to urgent care with new problem of rectal pain and constipation which has been worsening over the last week.  Patient reports recent history of external hemorrhoids, however has not had rectal exam for diagnosis.  Patient was prescribed Anusol HC and nitroglycerin ointment by PCP which he has been using over the last few weeks with little improvement. Patient also taking metamucil and is drinking plenty of fluids.   Patient report pain is worse with receptive anal intercourse. He reports intermittent prolapse since November of last year.   Denies melena.   Patient last bowel was this morning. Denies fevers or vomiting.   Patient denies history of abdominal surgeries.   Denies other associated aggravating or alleviating factors.     Review of Systems   Constitutional: Negative for chills, fever and malaise/fatigue.   Gastrointestinal: Positive for blood in stool and constipation. Negative for abdominal pain, diarrhea, melena, nausea and vomiting.   Genitourinary: Negative for dysuria, frequency, hematuria and urgency.        Rectal pain   Musculoskeletal: Negative for myalgias.   Neurological: Negative for headaches.   All other systems reviewed and are negative.    Past Medical History:   Diagnosis Date   • ASTHMA      Medications and allergies reviewed in [employer].  Social History     Tobacco Use   • Smoking status: Never Smoker   • Smokeless tobacco: Never Used   Substance Use Topics   • Alcohol use: No      Objective:     /72 (BP Location: Right arm, Patient Position: Sitting, BP Cuff Size: Adult)   Pulse 72   Temp 37 °C (98.6 °F) (Temporal)   Resp 16   Ht 1.854 m (6' 1\")   Wt 81.6 kg (180 lb)   SpO2 96%   BMI 23.75 kg/m²      Physical Exam  Vitals signs reviewed. Exam conducted with a chaperone present.   Constitutional:       " General: He is not in acute distress.     Appearance: Normal appearance. He is well-developed. He is not ill-appearing.   HENT:      Head: Normocephalic and atraumatic.      Mouth/Throat:      Mouth: Mucous membranes are moist.      Pharynx: Oropharynx is clear.   Eyes:      Conjunctiva/sclera: Conjunctivae normal.   Neck:      Musculoskeletal: Normal range of motion and neck supple.   Cardiovascular:      Rate and Rhythm: Normal rate and regular rhythm.   Pulmonary:      Effort: Pulmonary effort is normal. No respiratory distress.   Abdominal:      General: There is no distension.      Palpations: Abdomen is soft.      Tenderness: There is no abdominal tenderness. There is no right CVA tenderness, left CVA tenderness or guarding.   Genitourinary:     Rectum: Guaiac result positive. Tenderness present. No mass, anal fissure, external hemorrhoid or internal hemorrhoid.      Comments: No obvious anal prolapse on exam  Skin:     General: Skin is warm and dry.      Findings: No rash.   Neurological:      General: No focal deficit present.      Mental Status: He is alert and oriented to person, place, and time.   Psychiatric:         Mood and Affect: Mood normal.         Behavior: Behavior normal.         Thought Content: Thought content normal.         Judgment: Judgment normal.                  Assessment/Plan:     1. Partial rectal prolapse  REFERRAL TO GENERAL SURGERY     Patient advised to stop use of nitroglycerin ointment secondary to symptoms of lightheadedness and dizziness.  Continue with sitz baths for pain relief.  Patient given strict ER precautions for worsening of his symptoms including recurrence of anal prolapse.  Follow-up with general surgery for further evaluation.  Advised patient to avoid receptive anal intercourse.  Patient verbalized understanding of treatment plan and has no further questions regarding care.

## 2020-06-12 ENCOUNTER — PATIENT MESSAGE (OUTPATIENT)
Dept: MEDICAL GROUP | Age: 25
End: 2020-06-12

## 2020-06-12 ASSESSMENT — ENCOUNTER SYMPTOMS
MYALGIAS: 0
HEADACHES: 0
FEVER: 0
ABDOMINAL PAIN: 0
VOMITING: 0
DIARRHEA: 0
CONSTIPATION: 1
NAUSEA: 0
BLOOD IN STOOL: 1
CHILLS: 0

## 2020-07-07 ENCOUNTER — OFFICE VISIT (OUTPATIENT)
Dept: MEDICAL GROUP | Age: 25
End: 2020-07-07
Payer: COMMERCIAL

## 2020-07-07 VITALS
SYSTOLIC BLOOD PRESSURE: 110 MMHG | OXYGEN SATURATION: 100 % | WEIGHT: 181 LBS | DIASTOLIC BLOOD PRESSURE: 62 MMHG | TEMPERATURE: 97.8 F | HEART RATE: 76 BPM | BODY MASS INDEX: 23.99 KG/M2 | HEIGHT: 73 IN

## 2020-07-07 DIAGNOSIS — K59.00 CONSTIPATION, UNSPECIFIED CONSTIPATION TYPE: ICD-10-CM

## 2020-07-07 DIAGNOSIS — N17.9 AKI (ACUTE KIDNEY INJURY) (HCC): Primary | ICD-10-CM

## 2020-07-07 DIAGNOSIS — K64.9 HEMORRHOIDS, UNSPECIFIED HEMORRHOID TYPE: ICD-10-CM

## 2020-07-07 PROCEDURE — 99214 OFFICE O/P EST MOD 30 MIN: CPT | Performed by: FAMILY MEDICINE

## 2020-07-07 RX ORDER — POLYETHYLENE GLYCOL 3350 17 G/17G
17 POWDER, FOR SOLUTION ORAL DAILY
COMMUNITY
End: 2022-04-26

## 2020-07-09 NOTE — PROGRESS NOTES
"Subjective:   CC: DAVE f/u     HPI:     Marcos Feldman is a 25 y.o. male, established patient of the clinic, presents with the following concerns:     Pt is homosexual, previously taking Truvada. Most recently blood tests showed increased serum Cr and reduced GFR. Truvada was discontinued. Recent BMP was normal.     He suffers chronic hemorrhoid with bleeding. He had colonoscopy in 6/2020 which was notable for erosions near dentate line and below and erythema of distal colon. He has appointment for hemorrhoidectomy with Dr. Gross in near future.     Constipation is well controlled with Miralax and Metamucil.     Current medicines (including changes today)  Current Outpatient Medications   Medication Sig Dispense Refill   • polyethylene glycol 3350 (MIRALAX) Powder Take 17 g by mouth every day.     • Psyllium (METAMUCIL) 28 % packet Take 1 Packet by mouth 2 times a day. 60 Packet 3   • albuterol (VENTOLIN HFA) 108 (90 Base) MCG/ACT Aero Soln inhalation aerosol Inhale 2 Puffs by mouth every 6 hours as needed for Shortness of Breath. 8.5 g 5     No current facility-administered medications for this visit.      He  has a past medical history of ASTHMA.    I personally reviewed patient's problem list, allergies, medications, family hx, social hx with patient and update EPIC.     REVIEW OF SYSTEMS:  CONSTITUTIONAL:  Denies night sweats, fatigue, malaise, lethargy, fever or chills.  RESPIRATORY:  Denies cough, wheeze, hemoptysis, or shortness of breath.  CARDIOVASCULAR:  Denies chest pains, palpitations, pedal edema     Objective:     /62 (BP Location: Right arm, Patient Position: Sitting, BP Cuff Size: Adult)   Pulse 76   Temp 36.6 °C (97.8 °F) (Temporal)   Ht 1.854 m (6' 1\")   Wt 82.1 kg (181 lb)   SpO2 100%  Body mass index is 23.88 kg/m².    Physical Exam:  Constitutional: awake, alert, in no distress.  Skin: Warm, dry, good turgor, no rashes, bruises, ulcers in visible areas.  Eye: conjunctiva clear, lids " neg for edema or lesions.  Neck: Trachea midline, no masses, no thyromegaly. No cervical or supraclavicular lymphadenopathy  Respiratory: Unlabored respiratory effort, lungs clear to auscultation, no wheezes, no rales.  Cardiovascular: Normal S1, S2, no murmur, no pedal edema.  Psych: Oriented x3, affect and mood wnl, intact judgement and insight.       Assessment and Plan:   The following treatment plan was discussed    1. DAVE (acute kidney injury) (HCC)  Resolved with discontinuation of Truvada.   Reassurance provided.     2. Constipation, unspecified constipation type  Improving with hydration, Miralax PRN, and daily Metamucils.    3. Hemorrhoids, Dr. Gross   Keep appointment with Dr. Gross for hemorrhoidectomy.       Viri Hong M.D.      Followup: Return for As needed.    Please note that this dictation was created using voice recognition software. I have made every reasonable attempt to correct obvious errors, but I expect that there are errors of grammar and possibly content that I did not discover before finalizing the note.

## 2020-07-17 ENCOUNTER — OFFICE VISIT (OUTPATIENT)
Dept: MEDICAL GROUP | Age: 25
End: 2020-07-17
Payer: COMMERCIAL

## 2020-07-17 VITALS
DIASTOLIC BLOOD PRESSURE: 60 MMHG | HEART RATE: 72 BPM | BODY MASS INDEX: 23.99 KG/M2 | OXYGEN SATURATION: 97 % | SYSTOLIC BLOOD PRESSURE: 122 MMHG | TEMPERATURE: 98.5 F | WEIGHT: 181 LBS | HEIGHT: 73 IN

## 2020-07-17 DIAGNOSIS — K59.00 CONSTIPATION, UNSPECIFIED CONSTIPATION TYPE: ICD-10-CM

## 2020-07-17 DIAGNOSIS — Z91.89 AT RISK FOR HIV DUE TO HOMOSEXUAL CONTACT: ICD-10-CM

## 2020-07-17 DIAGNOSIS — Z00.00 PE (PHYSICAL EXAM), ANNUAL: Primary | ICD-10-CM

## 2020-07-17 DIAGNOSIS — Z11.3 SCREEN FOR STD (SEXUALLY TRANSMITTED DISEASE): ICD-10-CM

## 2020-07-17 DIAGNOSIS — K64.9 HEMORRHOIDS, UNSPECIFIED HEMORRHOID TYPE: ICD-10-CM

## 2020-07-17 PROCEDURE — 99395 PREV VISIT EST AGE 18-39: CPT | Performed by: FAMILY MEDICINE

## 2020-07-20 NOTE — PROGRESS NOTES
Subjective:   CC: Annual PE    HPI:     Marcos Feldman is a 25 y.o. male, established patient of the clinic, presents with the following concerns:     Patient is homosexual, previously taking Truvada for HIV prevention.  Unfortunately, he developed elevated serum creatinine and decreased GFR.  Truvada was discontinued.  His renal function improves after discontinuation of Truvada.  Patient is sexually active and currently using condoms for HIV prevention.  He remains interested in descovy, however, he is concerned that descovy might cause the same problem with his kidney.  Patient requests referral to infectious disease to discuss option for HIV prevention.    Patient has chronic, intermittent constipation.  He is currently taking Metamucil and MiraLAX PRN.  He states that his symptom is under good control at this time.  He try to increase hydration and fiber in his foods as well.    Patient had hx of bothersome hemorrhoids.  He was scheduled for hemorrhoidectomy, but states that he might cancel the surgery as his symptoms are improving.    Current medicines (including changes today)  Current Outpatient Medications   Medication Sig Dispense Refill   • polyethylene glycol 3350 (MIRALAX) Powder Take 17 g by mouth every day.     • Psyllium (METAMUCIL) 28 % packet Take 1 Packet by mouth 2 times a day. 60 Packet 3   • albuterol (VENTOLIN HFA) 108 (90 Base) MCG/ACT Aero Soln inhalation aerosol Inhale 2 Puffs by mouth every 6 hours as needed for Shortness of Breath. 8.5 g 5     No current facility-administered medications for this visit.      He  has a past medical history of ASTHMA.    I personally reviewed patient's problem list, allergies, medications, family hx, social hx with patient and update EPIC.     REVIEW OF SYSTEMS:  CONSTITUTIONAL:  Denies night sweats, fatigue, malaise, lethargy, fever or chills.  RESPIRATORY:  Denies cough, wheeze, hemoptysis, or shortness of breath.  CARDIOVASCULAR:  Denies chest pains,  "palpitations, pedal edema     Objective:     /60 (BP Location: Right arm, Patient Position: Sitting, BP Cuff Size: Adult)   Pulse 72   Temp 36.9 °C (98.5 °F) (Temporal)   Ht 1.854 m (6' 1\")   Wt 82.1 kg (181 lb)   SpO2 97%  Body mass index is 23.88 kg/m².    Physical Exam:  Constitutional: awake, alert, in no distress.  Skin: Warm, dry, good turgor, no rashes, bruises, ulcers in visible areas.  Eye: conjunctiva clear, lids neg for edema or lesions.  ENMT: TM and auditory canals wnl. Oral and nasal mucosa wnl. Lips without lesions, good dentition, oropharynx clear.  Neck: Trachea midline, no masses, no thyromegaly. No cervical or supraclavicular lymphadenopathy  Respiratory: Unlabored respiratory effort, lungs clear to auscultation, no wheezes, no rales.  Cardiovascular: Normal S1, S2, no murmur, no pedal edema.  Psych: Oriented x3, affect and mood wnl, intact judgement and insight.       Assessment and Plan:   The following treatment plan was discussed    1. At risk for HIV due to homosexual contact   Pt is homosexual. He previously took Truvada for HIV prevention. However, he suffers DAVE, which resolved after discontinuation of Truvada.  Patient is interested in other forms of prep therapy.  However, he is concerned that Descovy might give him the same problems with his kidney as with Truvada.  He is interested in referral to infectious disease for consultation.  In the meantime, he will continue to use condoms for STD prevention.  - REFERRAL TO INFECTIOUS DISEASE  - Safe sex counseling provided    2. Constipation, unspecified constipation type  Improving with hydration, increase dietary fiber, MiraLAX/Metamucil as needed.  We will continue to monitor.    3. Hemorrhoids, unspecified hemorrhoid type  Patient is scheduled for hemorrhoidectomy in near future, however, he is planning to cancel the surgery due to resolution of symptoms.  -Constipation prevention discussed    4. Screen for STD (sexually " transmitted disease)  - T.PALLIDUM AB EIA; Future  - HIV AG/AB COMBO ASSAY SCREENING; Future  - Chlamydia/GC PCR Urine Or Swab; Future    5. PE (physical exam), annual  General health and wellness counseling provided. Topics might include: diet, exercise, vitamin supplement, mental health, sleep, stress, preventive cares and vaccine recommendations.    - CBC WITH DIFFERENTIAL; Future  - Comp Metabolic Panel; Future  - HEMOGLOBIN A1C; Future  - Lipid Profile; Future      Ly PARVEEN Hong M.D.      Followup: Return in about 1 year (around 7/17/2021) for annual PE.    Please note that this dictation was created using voice recognition software. I have made every reasonable attempt to correct obvious errors, but I expect that there are errors of grammar and possibly content that I did not discover before finalizing the note.

## 2020-12-04 ENCOUNTER — OFFICE VISIT (OUTPATIENT)
Dept: MEDICAL GROUP | Age: 25
End: 2020-12-04
Payer: COMMERCIAL

## 2020-12-04 VITALS
TEMPERATURE: 97.6 F | OXYGEN SATURATION: 95 % | SYSTOLIC BLOOD PRESSURE: 130 MMHG | HEART RATE: 74 BPM | BODY MASS INDEX: 26.51 KG/M2 | HEIGHT: 73 IN | DIASTOLIC BLOOD PRESSURE: 80 MMHG | WEIGHT: 200 LBS

## 2020-12-04 DIAGNOSIS — N28.9 ABNORMAL RENAL FUNCTION: ICD-10-CM

## 2020-12-04 DIAGNOSIS — K59.00 CONSTIPATION, UNSPECIFIED CONSTIPATION TYPE: ICD-10-CM

## 2020-12-04 DIAGNOSIS — Z91.89 AT RISK FOR HIV DUE TO HOMOSEXUAL CONTACT: Primary | ICD-10-CM

## 2020-12-04 DIAGNOSIS — Z23 NEED FOR VACCINATION: ICD-10-CM

## 2020-12-04 PROCEDURE — 99214 OFFICE O/P EST MOD 30 MIN: CPT | Mod: 25 | Performed by: FAMILY MEDICINE

## 2020-12-04 PROCEDURE — 90686 IIV4 VACC NO PRSV 0.5 ML IM: CPT | Performed by: FAMILY MEDICINE

## 2020-12-04 PROCEDURE — 90471 IMMUNIZATION ADMIN: CPT | Performed by: FAMILY MEDICINE

## 2020-12-04 RX ORDER — EMTRICITABINE AND TENOFOVIR ALAFENAMIDE 200; 25 MG/1; MG/1
TABLET ORAL
COMMUNITY
Start: 2020-11-05 | End: 2022-04-19

## 2020-12-04 NOTE — PROGRESS NOTES
Subjective:   CC: Descovy follow up     HPI:     Marcos Feldman is a 25 y.o. male, currently sexually active with male partner.  Patient is using condoms consistently for STD prevention.  He was previously taking Truvada to prevent HIV infection.  However, he was found to have worsening kidney function which was attributable to Truvada.  Truvada was then discontinued a few months ago.  Patient was seen by [facility], he was started on Descovy approximately 2 months ago.  He states that his blood tests following 4 weeks of Descovy also show worsening serum creatinine.  Patient normally takes creatine for exercising enhancement.  However, he was asked to stop taking creatine 4 weeks before the blood test, but serum Cr was elevated. It is suggested by [facility] that he has renal US done to make sure there is not other problems with the kidney. He presents today to discuss the need for renal US. He continues to tolerate Descovy well, no s/e reported. He endorses adequate hydration. He continues to follow up with [facility] for management of Descovy. He will cont to have routine STD screening every 6 months.     He has chronic constipation does have resolved with increased hydration, MiraLAX, and Metamucil.     Current medicines (including changes today)  Current Outpatient Medications   Medication Sig Dispense Refill   • DESCOVY 200-25 MG Tab tablet TAKE ONE TABLET BY MOUTH DAILY     • CREATINE PO Take  by mouth.     • polyethylene glycol 3350 (MIRALAX) Powder Take 17 g by mouth every day.     • Psyllium (METAMUCIL) 28 % packet Take 1 Packet by mouth 2 times a day. 60 Packet 3   • albuterol (VENTOLIN HFA) 108 (90 Base) MCG/ACT Aero Soln inhalation aerosol Inhale 2 Puffs by mouth every 6 hours as needed for Shortness of Breath. 8.5 g 5     No current facility-administered medications for this visit.      He  has a past medical history of ASTHMA.    I personally reviewed patient's problem list,  "allergies, medications, family hx, social hx with patient and update EPIC.     REVIEW OF SYSTEMS:  CONSTITUTIONAL:  Denies night sweats, fatigue, malaise, lethargy, fever or chills.  RESPIRATORY:  Denies cough, wheeze, hemoptysis, or shortness of breath.  CARDIOVASCULAR:  Denies chest pains, palpitations, pedal edema     Objective:     /80 (BP Location: Left arm, Patient Position: Sitting, BP Cuff Size: Adult)   Pulse 74   Temp 36.4 °C (97.6 °F) (Temporal)   Ht 1.854 m (6' 1\")   Wt 90.7 kg (200 lb)   SpO2 95%  Body mass index is 26.39 kg/m².    Physical Exam:  Constitutional: awake, alert, in no distress.  Skin: Warm, dry, good turgor, no rashes, bruises, ulcers in visible areas.  Eye: conjunctiva clear, lids neg for edema or lesions.  Neck: Trachea midline, no masses, no thyromegaly. No cervical or supraclavicular lymphadenopathy  Respiratory: Unlabored respiratory effort, lungs clear to auscultation, no wheezes, no rales.  Cardiovascular: Normal S1, S2, no murmur, no pedal edema.  Psych: Oriented x3, affect and mood wnl, intact judgement and insight.       Assessment and Plan:   The following treatment plan was discussed    1. At risk for HIV due to homosexual contact   On Descovy for PrEP therapy managed for [facility] and tolerates it well.   He has negative STD screen every 6 months so far.   He continues to use condoms regularly.   - f/u with [facility]   - STD screen every 6 months   - cont condoms consistently     2. Abnormal renal function  - US-RENAL; Future    3. Constipation, unspecified constipation type  Resolved with increased hydration, Metamucil, and Miralax.     4. Need for vaccination  - Influenza Vaccine Quad Injection (PF)      Viri Hong M.D.      Followup: Return in about 6 months (around 6/4/2021) for Multiple issues.    Please note that this dictation was created using voice recognition software. I have made every reasonable attempt to correct obvious errors, but I expect " that there are errors of grammar and possibly content that I did not discover before finalizing the note.

## 2020-12-11 LAB — HBA1C MFR BLD: 4.7 % (ref 0–5.6)

## 2020-12-18 DIAGNOSIS — Z84.1: ICD-10-CM

## 2020-12-18 DIAGNOSIS — T50.905A DRUG-INDUCED ACUTE RENAL FAILURE (HCC): ICD-10-CM

## 2020-12-18 DIAGNOSIS — N17.9 DRUG-INDUCED ACUTE RENAL FAILURE (HCC): ICD-10-CM

## 2020-12-29 ENCOUNTER — HOSPITAL ENCOUNTER (OUTPATIENT)
Dept: RADIOLOGY | Facility: MEDICAL CENTER | Age: 25
End: 2020-12-29
Attending: FAMILY MEDICINE
Payer: COMMERCIAL

## 2020-12-29 DIAGNOSIS — N28.9 ABNORMAL RENAL FUNCTION: ICD-10-CM

## 2020-12-29 PROCEDURE — 76775 US EXAM ABDO BACK WALL LIM: CPT

## 2021-01-26 ENCOUNTER — OFFICE VISIT (OUTPATIENT)
Dept: NEPHROLOGY | Facility: MEDICAL CENTER | Age: 26
End: 2021-01-26
Payer: COMMERCIAL

## 2021-01-26 VITALS
DIASTOLIC BLOOD PRESSURE: 62 MMHG | TEMPERATURE: 98.5 F | OXYGEN SATURATION: 98 % | SYSTOLIC BLOOD PRESSURE: 124 MMHG | BODY MASS INDEX: 26.77 KG/M2 | WEIGHT: 202 LBS | RESPIRATION RATE: 16 BRPM | HEART RATE: 62 BPM | HEIGHT: 73 IN

## 2021-01-26 DIAGNOSIS — N17.9 AKI (ACUTE KIDNEY INJURY) (HCC): ICD-10-CM

## 2021-01-26 PROCEDURE — 99204 OFFICE O/P NEW MOD 45 MIN: CPT | Performed by: INTERNAL MEDICINE

## 2021-01-26 ASSESSMENT — ENCOUNTER SYMPTOMS
VOMITING: 0
COUGH: 0
FEVER: 0
NAUSEA: 0
SHORTNESS OF BREATH: 0
CHILLS: 0

## 2021-01-27 NOTE — PROGRESS NOTES
"Subjective:      Marcos Feldman is a 25 y.o. male who presents with Chronic Kidney Disease            The patient is a 25-year-old gentleman, who was treated with Truvada as HIV prevention back in late 2018, in April 2019 patient developed acute kidney injury and Truvada was discontinued, his kidney function has improved, however in July 2020 patient was started  on Descovy, his kidney function has gotten worse since, recent creatinine from December 2020 1.64 mg/dL.  Patient also does bodybuilding training, he started taking creatinine in the summer 2020, and is on high protein diet averaging 300 g per 24-hour  Patient uses NSAIDs very infrequently    Chronic Kidney Disease  This is a new problem. The current episode started more than 1 month ago. The problem occurs intermittently. The problem has been waxing and waning. Pertinent negatives include no chest pain, chills, coughing, fever, nausea, urinary symptoms or vomiting.       Review of Systems   Constitutional: Negative for chills, fever and malaise/fatigue.   Respiratory: Negative for cough and shortness of breath.    Cardiovascular: Negative for chest pain and leg swelling.   Gastrointestinal: Negative for nausea and vomiting.   Genitourinary: Negative for dysuria, frequency and urgency.          Objective:     /62 (BP Location: Right arm, Patient Position: Sitting, BP Cuff Size: Adult)   Pulse 62   Temp 36.9 °C (98.5 °F) (Temporal)   Resp 16   Ht 1.854 m (6' 1\")   Wt 91.6 kg (202 lb)   SpO2 98%   BMI 26.65 kg/m²      Physical Exam  Vitals signs and nursing note reviewed.   Constitutional:       General: He is not in acute distress.     Appearance: He is not ill-appearing.   HENT:      Head: Normocephalic and atraumatic.      Right Ear: External ear normal.      Left Ear: External ear normal.      Nose: Nose normal.   Eyes:      General:         Right eye: No discharge.         Left eye: No discharge.      Conjunctiva/sclera: Conjunctivae " normal.   Cardiovascular:      Rate and Rhythm: Normal rate and regular rhythm.      Heart sounds: No murmur.   Pulmonary:      Effort: Pulmonary effort is normal. No respiratory distress.      Breath sounds: Normal breath sounds. No wheezing.   Musculoskeletal:         General: No tenderness or deformity.      Right lower leg: No edema.      Left lower leg: No edema.   Skin:     General: Skin is warm.   Neurological:      General: No focal deficit present.      Mental Status: He is alert and oriented to person, place, and time.   Psychiatric:         Mood and Affect: Mood normal.         Behavior: Behavior normal.       Past Medical History:   Diagnosis Date   • ASTHMA      Social History     Socioeconomic History   • Marital status: Single     Spouse name: Not on file   • Number of children: Not on file   • Years of education: Not on file   • Highest education level: Not on file   Occupational History   • Not on file   Social Needs   • Financial resource strain: Not on file   • Food insecurity     Worry: Not on file     Inability: Not on file   • Transportation needs     Medical: Not on file     Non-medical: Not on file   Tobacco Use   • Smoking status: Never Smoker   • Smokeless tobacco: Never Used   Substance and Sexual Activity   • Alcohol use: No   • Drug use: No   • Sexual activity: Yes     Partners: Male   Lifestyle   • Physical activity     Days per week: Not on file     Minutes per session: Not on file   • Stress: Not on file   Relationships   • Social connections     Talks on phone: Not on file     Gets together: Not on file     Attends Denominational service: Not on file     Active member of club or organization: Not on file     Attends meetings of clubs or organizations: Not on file     Relationship status: Not on file   • Intimate partner violence     Fear of current or ex partner: Not on file     Emotionally abused: Not on file     Physically abused: Not on file     Forced sexual activity: Not on file    Other Topics Concern   • Not on file   Social History Narrative   • Not on file     Family History   Problem Relation Age of Onset   • Hypertension Mother    • Alcohol/Drug Mother    • Hypertension Father    • Alcohol/Drug Father    • Arthritis Father    • Arthritis Paternal Grandmother      No results for input(s): HGB, ALBUMIN, HDL, TRIGLYCERIDE, SODIUM, POTASSIUM, CHLORIDE, CO2, BUN, CREATININE, PHOSPHORUS in the last 8544 hours.    Invalid input(s): CHOLESTEROL, LDL CLACULATED, URIC ACID, INTACT PTH, PRO CREAT RATIO   I have reviewed the renal ultrasound images from December 2020 with the patient, it showed no hydronephrosis              Assessment/Plan:        1. DAVE (acute kidney injury) (HCC)  The etiology is most likely side effect of the descobey  There is also a possibility of side effect of creatine   Patient has uremic symptoms  No acute need for dialysis  I recommend to discontinue Descovy  Discontinue creatinine  Decrease protein intake to 90 g/24-hour  Recheck labs in 2 months after hydration  Avoid nephrotoxins like NSAIDs  Renal dose on medication    Over 50% of this 45 minute visit was spent on counseling and coordination of care regarding diet, side effects, and complication.

## 2021-04-20 ENCOUNTER — IMMUNIZATION (OUTPATIENT)
Dept: FAMILY PLANNING/WOMEN'S HEALTH CLINIC | Facility: IMMUNIZATION CENTER | Age: 26
End: 2021-04-20
Payer: COMMERCIAL

## 2021-04-20 DIAGNOSIS — Z23 ENCOUNTER FOR VACCINATION: Primary | ICD-10-CM

## 2021-04-20 PROCEDURE — 0001A PFIZER SARS-COV-2 VACCINE: CPT

## 2021-04-20 PROCEDURE — 91300 PFIZER SARS-COV-2 VACCINE: CPT

## 2021-04-23 ENCOUNTER — NURSE TRIAGE (OUTPATIENT)
Dept: HEALTH INFORMATION MANAGEMENT | Facility: OTHER | Age: 26
End: 2021-04-23

## 2021-04-23 NOTE — TELEPHONE ENCOUNTER
COVID vaccination 4/20/21  Next day he developed body aches, yesterday fatigue, today feels dehydrated, difficulty sleeping.   Has been taking ibuprofen and tylenol.   Aches are better today.   Okay to keep scheduled appointment on 4/28/21 if not symptomatic. If still having symptoms by then, he will need to delay appointment and possibly get tested for COVID.

## 2021-04-27 ENCOUNTER — APPOINTMENT (OUTPATIENT)
Dept: NEPHROLOGY | Facility: MEDICAL CENTER | Age: 26
End: 2021-04-27
Payer: COMMERCIAL

## 2021-04-28 ENCOUNTER — OFFICE VISIT (OUTPATIENT)
Dept: NEPHROLOGY | Facility: MEDICAL CENTER | Age: 26
End: 2021-04-28
Payer: COMMERCIAL

## 2021-04-28 VITALS
HEART RATE: 50 BPM | OXYGEN SATURATION: 98 % | TEMPERATURE: 98.2 F | DIASTOLIC BLOOD PRESSURE: 74 MMHG | SYSTOLIC BLOOD PRESSURE: 122 MMHG

## 2021-04-28 DIAGNOSIS — R80.9 PROTEINURIA, UNSPECIFIED TYPE: ICD-10-CM

## 2021-04-28 DIAGNOSIS — N17.9 AKI (ACUTE KIDNEY INJURY) (HCC): ICD-10-CM

## 2021-04-28 PROCEDURE — 99214 OFFICE O/P EST MOD 30 MIN: CPT | Performed by: INTERNAL MEDICINE

## 2021-04-28 ASSESSMENT — ENCOUNTER SYMPTOMS
VOMITING: 0
CHILLS: 0
NAUSEA: 0
COUGH: 0
FEVER: 0
SHORTNESS OF BREATH: 0

## 2021-04-28 NOTE — PROGRESS NOTES
Subjective:      Marcos Feldman is a 26 y.o. male who presents with Chronic Kidney Disease            Patient developed acute kidney injury as a side effect from Truvada, his kidney function has improved  Patient has nonnephrotic range proteinuria, no recent use of NSAIDs.  He is a  and is on high protein daily intake.    Chronic Kidney Disease  This is a new problem. The current episode started more than 1 month ago. The problem occurs intermittently. The problem has been rapidly improving. Pertinent negatives include no chest pain, chills, coughing, fever, nausea, urinary symptoms or vomiting. Exacerbated by: Truvada.       Review of Systems   Constitutional: Negative for chills, fever and malaise/fatigue.   Respiratory: Negative for cough and shortness of breath.    Cardiovascular: Negative for chest pain and leg swelling.   Gastrointestinal: Negative for nausea and vomiting.   Genitourinary: Negative for dysuria, frequency and urgency.          Objective:     /74 (BP Location: Right arm, Patient Position: Sitting, BP Cuff Size: Adult)   Pulse (!) 50   Temp 36.8 °C (98.2 °F) (Temporal)   SpO2 98%      Physical Exam  Vitals and nursing note reviewed.   Constitutional:       General: He is not in acute distress.     Appearance: He is not ill-appearing.   HENT:      Head: Normocephalic and atraumatic.      Right Ear: External ear normal.      Left Ear: External ear normal.      Nose: Nose normal.   Eyes:      General:         Right eye: No discharge.         Left eye: No discharge.      Conjunctiva/sclera: Conjunctivae normal.   Cardiovascular:      Rate and Rhythm: Normal rate and regular rhythm.      Heart sounds: No murmur.   Pulmonary:      Effort: Pulmonary effort is normal. No respiratory distress.      Breath sounds: Normal breath sounds. No wheezing.   Musculoskeletal:         General: No tenderness or deformity.      Right lower leg: No edema.      Left lower leg: No edema.   Skin:      General: Skin is warm.   Neurological:      General: No focal deficit present.      Mental Status: He is alert and oriented to person, place, and time.   Psychiatric:         Mood and Affect: Mood normal.         Behavior: Behavior normal.     Recent lab from April 26, 2021 were reviewed showed creatinine 1.0            Assessment/Plan:        1. DAVE (acute kidney injury) (HCC)  Secondary to Truvada  Creatinine has improved after discontinuing the medication  Renal dose of medication  Avoid nephrotoxins  Recheck labs in 6 months  2. Proteinuria, unspecified type  Patient was advised to decrease his daily protein intake  Recheck labs in 6 months

## 2021-04-29 DIAGNOSIS — D69.6 THROMBOCYTOPENIA (HCC): ICD-10-CM

## 2021-05-14 ENCOUNTER — IMMUNIZATION (OUTPATIENT)
Dept: FAMILY PLANNING/WOMEN'S HEALTH CLINIC | Facility: IMMUNIZATION CENTER | Age: 26
End: 2021-05-14
Payer: COMMERCIAL

## 2021-05-14 DIAGNOSIS — Z23 ENCOUNTER FOR VACCINATION: Primary | ICD-10-CM

## 2021-05-14 PROCEDURE — 0002A PFIZER SARS-COV-2 VACCINE: CPT

## 2021-05-14 PROCEDURE — 91300 PFIZER SARS-COV-2 VACCINE: CPT

## 2022-04-19 DIAGNOSIS — Z21 ASYMPTOMATIC HIV INFECTION, WITH NO HISTORY OF HIV-RELATED ILLNESS (HCC): ICD-10-CM

## 2022-04-19 DIAGNOSIS — N17.9 AKI (ACUTE KIDNEY INJURY) (HCC): ICD-10-CM

## 2022-04-19 PROBLEM — B20 HIV INFECTION (HCC): Status: ACTIVE | Noted: 2021-07-01

## 2022-04-19 PROBLEM — Z91.89 AT RISK FOR HIV DUE TO HOMOSEXUAL CONTACT: Status: RESOLVED | Noted: 2019-01-22 | Resolved: 2022-04-19

## 2022-04-26 ENCOUNTER — OFFICE VISIT (OUTPATIENT)
Dept: NEPHROLOGY | Facility: MEDICAL CENTER | Age: 27
End: 2022-04-26

## 2022-04-26 VITALS
SYSTOLIC BLOOD PRESSURE: 106 MMHG | OXYGEN SATURATION: 97 % | BODY MASS INDEX: 24.52 KG/M2 | HEIGHT: 73 IN | HEART RATE: 64 BPM | TEMPERATURE: 98.2 F | WEIGHT: 185 LBS | DIASTOLIC BLOOD PRESSURE: 66 MMHG

## 2022-04-26 DIAGNOSIS — N17.9 AKI (ACUTE KIDNEY INJURY) (HCC): ICD-10-CM

## 2022-04-26 DIAGNOSIS — Z21 ASYMPTOMATIC HIV INFECTION, WITH NO HISTORY OF HIV-RELATED ILLNESS (HCC): ICD-10-CM

## 2022-04-26 DIAGNOSIS — Z86.39 HISTORY OF VITAMIN D DEFICIENCY: ICD-10-CM

## 2022-04-26 PROCEDURE — 99214 OFFICE O/P EST MOD 30 MIN: CPT | Performed by: INTERNAL MEDICINE

## 2022-04-26 RX ORDER — DOLUTEGRAVIR SODIUM AND LAMIVUDINE 50; 300 MG/1; MG/1
1 TABLET, FILM COATED ORAL DAILY
COMMUNITY
Start: 2022-04-04 | End: 2022-04-26

## 2022-04-26 ASSESSMENT — ENCOUNTER SYMPTOMS
FEVER: 0
SHORTNESS OF BREATH: 0
ABDOMINAL PAIN: 0

## 2022-04-26 NOTE — PROGRESS NOTES
Chief Complaint   Patient presents with   • Follow-Up     PCP suggested patient see    • Chronic Kidney Disease     CC: my kidney labs are worsening  Requesting Provider: Dr. Miguelina Abdi    HPI:  Marcos Feldman is a 27 y.o. male with well controlled HIV on HAART, who presents today to establish nephrology care.     Re: HIV, diagnosed May, 2021.  He was previously prescribed Truvada PrEP (emtricitabine-tenofovir) for 1.5-2 years, but he was told the Truvada might be hurting his kidneys, so he stopped taking it, and then got HIV.  But after diagnosis, was started on Dovato (dolutegravir- lamivudine), and then switched in April 2022 to Juluca (dolutegravir-rilpivirine). HIV is well controlled, undetectable.     Re: DAVE, noted on outpatient labs at [facility]. He works out almost every single day, weight training 4 times a week, and cardio training 5-6 times a week. He is working out 2 hours at a time when doing weight training. He started focusing more on weight training in 2020. Before 2020, he was doing more regular cardio. He denies hematuria. He denies frothy urine. He takes ibuprofen maybe once a month. He denies LUTS.     Re: kidney stone, happened once when he was very young.       Past Medical History:   Diagnosis Date   • ASTHMA        Past Surgical History:   Procedure Laterality Date   • IRRIGATION & DEBRIDEMENT ORTHO  6/5/08    Performed by ERNIE ZIMMER at SURGERY [facility] ORS   • PIN INSERTION  6/5/08    Performed by ERNIE ZIMMER at SURGERY [facility] ORS   • WOUND CLOSURE ORTHO  6/5/08    Performed by ERNIE ZIMMER at SURGERY [facility] ORS   • INCISION AND DRAINAGE ORTHOPEDIC  4/17/08    Performed by ERNIE ZIMMER at SURGERY [facility] ORS   • INCISION AND DRAINAGE ORTHOPEDIC  4/15/08    Performed by ERNIE ZIMMER at SURGERY [facility]   • OTHER ORTHOPEDIC SURGERY Right     right foot        Outpatient Encounter Medications as of 4/26/2022    Medication Sig Dispense Refill   • Dolutegravir-Rilpivirine 50-25 MG Tab Take 1 Tablet by mouth every day.     • albuterol (VENTOLIN HFA) 108 (90 Base) MCG/ACT Aero Soln inhalation aerosol Inhale 2 Puffs by mouth every 6 hours as needed for Shortness of Breath. 8.5 g 5   • [DISCONTINUED] DOVATO  MG Tab Take 1 Tablet by mouth every day. (Patient not taking: Reported on 4/26/2022)     • [DISCONTINUED] CREATINE PO Take  by mouth. (Patient not taking: Reported on 4/26/2022)     • [DISCONTINUED] polyethylene glycol 3350 (MIRALAX) 17 GM/SCOOP Powder Take 17 g by mouth every day.     • [DISCONTINUED] Psyllium (METAMUCIL) 28 % packet Take 1 Packet by mouth 2 times a day. 60 Packet 3     No facility-administered encounter medications on file as of 4/26/2022.        Allergies   Allergen Reactions   • No Known Drug Allergy        Social History     Socioeconomic History   • Marital status: Single     Spouse name: Not on file   • Number of children: Not on file   • Years of education: Not on file   • Highest education level: Not on file   Occupational History   • Not on file   Tobacco Use   • Smoking status: Never Smoker   • Smokeless tobacco: Never Used   Vaping Use   • Vaping Use: Never used   Substance and Sexual Activity   • Alcohol use: No   • Drug use: No   • Sexual activity: Yes     Partners: Male   Other Topics Concern   • Not on file   Social History Narrative    Works in [employer]     Social Determinants of Health     Financial Resource Strain: Not on file   Food Insecurity: Not on file   Transportation Needs: Not on file   Physical Activity: Not on file   Stress: Not on file   Social Connections: Not on file   Intimate Partner Violence: Not on file   Housing Stability: Not on file       Family History   Problem Relation Age of Onset   • Hypertension Mother    • Alcohol/Drug Mother    • Hypertension Father    • Alcohol/Drug Father    • Arthritis Father    • Arthritis Paternal Grandmother    •  "Hypertension Paternal Grandfather    • Kidney Disease Paternal Grandfather         ESRD for a year prior to death in his 80's.        Review of Systems   Constitutional: Negative for fever.   Respiratory: Negative for shortness of breath.    Cardiovascular: Negative for chest pain.   Gastrointestinal: Negative for abdominal pain.   All other systems reviewed and are negative.      /66 (BP Location: Right arm, Patient Position: Sitting, BP Cuff Size: Adult)   Pulse 64   Temp 36.8 °C (98.2 °F) (Temporal)   Ht 1.854 m (6' 1\")   Wt 83.9 kg (185 lb)   SpO2 97%   BMI 24.41 kg/m²     Physical Exam  Constitutional:       General: He is not in acute distress.  HENT:      Mouth/Throat:      Pharynx: No oropharyngeal exudate.   Eyes:      General: No scleral icterus.  Neck:      Trachea: No tracheal deviation.   Cardiovascular:      Rate and Rhythm: Normal rate and regular rhythm.      Heart sounds: Normal heart sounds. No murmur heard.  Pulmonary:      Effort: Pulmonary effort is normal.      Breath sounds: Normal breath sounds. No stridor. No rales.   Abdominal:      General: Bowel sounds are normal.      Palpations: Abdomen is soft.      Tenderness: There is no abdominal tenderness.   Musculoskeletal:         General: Normal range of motion.      Cervical back: Neck supple.      Right lower leg: No edema.      Left lower leg: No edema.   Skin:     General: Skin is warm and dry.      Findings: No rash.   Neurological:      General: No focal deficit present.      Mental Status: He is alert and oriented to person, place, and time.   Psychiatric:         Mood and Affect: Mood and affect normal.         Behavior: Behavior normal.           Labs reviewed.    Labs 4/22/2022  Creatinine 1.44  Estimated GFR 65  Electrolytes within normal limits  HIV undetectable    Labs 4/18/2022 creatinine 1.60  Estimated GFR 57  Electrolytes within normal limits    Labs done 4/26/2021  Creatinine 1.02  Estimated /115  Urine " albumin creatinine ratio 53 mg/g    No results for input(s): HGB, ALBUMIN, HDL, TRIGLYCERIDE, SODIUM, POTASSIUM, CHLORIDE, CO2, BUN, CREATININE, PHOSPHORUS in the last 8544 hours.    Invalid input(s): CHOLESTEROL, LDL CLACULATED, URIC ACID, INTACT PTH, PRO CREAT RATIO    Lab Results   Component Value Date/Time    WBC 8.7 04/14/2008 05:50 PM    RBC 4.81 04/14/2008 05:50 PM    HEMOGLOBIN 15.0 04/14/2008 05:50 PM    HEMATOCRIT 41.6 (L) 04/14/2008 05:50 PM    MCV 86.4 04/14/2008 05:50 PM    MCH 31.2 04/14/2008 05:50 PM    MCHC 36.1 (H) 04/14/2008 05:50 PM    MPV 7.0 04/14/2008 05:50 PM           URINALYSIS:  No results found for: COLORURINE, CLARITY, SPECGRAVITY, PHURINE, KETONES, PROTEINURIN, BILIRUBINUR, UROBILU, NITRITE, LEUKESTERAS, OCCULTBLOOD  UPC  No results found for: TOTPROTUR No results found for: CREATININEU      Imaging reviewed  No orders to display         Assessment:  Marcos Feldman is a 27 y.o. male with well controlled HIV on HAART, who presents today to establish nephrology care.     Plan:  1. DAVE (acute kidney injury) (HCC)  -Unclear etiology of DAVE.  Patient does have an intense exercise regimen since 2020, and might have increased serum creatinine from excess muscularity, as well as blocked tubular secretion of creatinine from dolutegravir.  I recommend checking 24-hour urine creatinine clearance.  I also recommend checking urinalysis, urine protein creatinine ratio, urine albumin creatinine ratio.  If urine indices and creatinine clearance are abnormal, patient might have IgA nephropathy, and I would recommend kidney biopsy.    2. Asymptomatic HIV infection, with no history of HIV-related illness (HCC)  -Very well controlled on dolutegravir rilpivirine.  Of note, dolutegravir can block tubular secretion of creatinine, but is not a nephrotoxin.  Continue further management per infectious disease at [facility] [place] [place] by [facility].      Return to clinic 1 year with pre-clinic labs, possibly sooner if  urine labs and creatinine clearance are abnormal    Dylan Gee MD  Nephrology

## 2022-05-04 ENCOUNTER — TELEPHONE (OUTPATIENT)
Dept: NEPHROLOGY | Facility: MEDICAL CENTER | Age: 27
End: 2022-05-04

## 2022-05-04 PROBLEM — N17.9 DRUG-INDUCED ACUTE RENAL FAILURE (HCC): Status: RESOLVED | Noted: 2020-06-01 | Resolved: 2022-05-04

## 2022-05-04 PROBLEM — T50.905A DRUG-INDUCED ACUTE RENAL FAILURE (HCC): Status: RESOLVED | Noted: 2020-06-01 | Resolved: 2022-05-04

## 2022-05-04 NOTE — TELEPHONE ENCOUNTER
24-hour urine creatinine clearance over 100.  Serum creatinine likely high due to excess muscularity from increased exercise activity.  The patient does not truly have CKD.  There is no need for nephrology follow-up.    Dylan Gee MD  Nephrology

## 2023-02-17 ENCOUNTER — PRE-ADMISSION TESTING (OUTPATIENT)
Dept: ADMISSIONS | Facility: MEDICAL CENTER | Age: 28
End: 2023-02-17
Attending: SURGERY
Payer: COMMERCIAL

## 2023-03-07 ENCOUNTER — ANESTHESIA (OUTPATIENT)
Dept: SURGERY | Facility: MEDICAL CENTER | Age: 28
End: 2023-03-07
Payer: COMMERCIAL

## 2023-03-07 ENCOUNTER — HOSPITAL ENCOUNTER (OUTPATIENT)
Facility: MEDICAL CENTER | Age: 28
End: 2023-03-07
Attending: SURGERY | Admitting: SURGERY
Payer: COMMERCIAL

## 2023-03-07 ENCOUNTER — ANESTHESIA EVENT (OUTPATIENT)
Dept: SURGERY | Facility: MEDICAL CENTER | Age: 28
End: 2023-03-07
Payer: COMMERCIAL

## 2023-03-07 VITALS
BODY MASS INDEX: 27.32 KG/M2 | RESPIRATION RATE: 16 BRPM | DIASTOLIC BLOOD PRESSURE: 64 MMHG | HEIGHT: 73 IN | SYSTOLIC BLOOD PRESSURE: 121 MMHG | OXYGEN SATURATION: 94 % | TEMPERATURE: 97.3 F | HEART RATE: 49 BPM | WEIGHT: 206.13 LBS

## 2023-03-07 DIAGNOSIS — G89.18 POST-OP PAIN: ICD-10-CM

## 2023-03-07 PROCEDURE — 160048 HCHG OR STATISTICAL LEVEL 1-5: Performed by: SURGERY

## 2023-03-07 PROCEDURE — 700105 HCHG RX REV CODE 258: Performed by: SURGERY

## 2023-03-07 PROCEDURE — 700111 HCHG RX REV CODE 636 W/ 250 OVERRIDE (IP): Performed by: ANESTHESIOLOGY

## 2023-03-07 PROCEDURE — 160002 HCHG RECOVERY MINUTES (STAT): Performed by: SURGERY

## 2023-03-07 PROCEDURE — 160027 HCHG SURGERY MINUTES - 1ST 30 MINS LEVEL 2: Performed by: SURGERY

## 2023-03-07 PROCEDURE — 700101 HCHG RX REV CODE 250: Performed by: SURGERY

## 2023-03-07 PROCEDURE — 160046 HCHG PACU - 1ST 60 MINS PHASE II: Performed by: SURGERY

## 2023-03-07 PROCEDURE — 700105 HCHG RX REV CODE 258: Performed by: ANESTHESIOLOGY

## 2023-03-07 PROCEDURE — 00902 ANES ANORECTAL PX: CPT | Performed by: ANESTHESIOLOGY

## 2023-03-07 PROCEDURE — 88305 TISSUE EXAM BY PATHOLOGIST: CPT | Mod: 59

## 2023-03-07 PROCEDURE — 160035 HCHG PACU - 1ST 60 MINS PHASE I: Performed by: SURGERY

## 2023-03-07 PROCEDURE — A9270 NON-COVERED ITEM OR SERVICE: HCPCS | Performed by: ANESTHESIOLOGY

## 2023-03-07 PROCEDURE — 160025 RECOVERY II MINUTES (STATS): Performed by: SURGERY

## 2023-03-07 PROCEDURE — 160009 HCHG ANES TIME/MIN: Performed by: SURGERY

## 2023-03-07 PROCEDURE — 160038 HCHG SURGERY MINUTES - EA ADDL 1 MIN LEVEL 2: Performed by: SURGERY

## 2023-03-07 PROCEDURE — 88342 IMHCHEM/IMCYTCHM 1ST ANTB: CPT

## 2023-03-07 PROCEDURE — 700101 HCHG RX REV CODE 250: Performed by: ANESTHESIOLOGY

## 2023-03-07 PROCEDURE — 700102 HCHG RX REV CODE 250 W/ 637 OVERRIDE(OP): Performed by: ANESTHESIOLOGY

## 2023-03-07 RX ORDER — DEXAMETHASONE SODIUM PHOSPHATE 4 MG/ML
INJECTION, SOLUTION INTRA-ARTICULAR; INTRALESIONAL; INTRAMUSCULAR; INTRAVENOUS; SOFT TISSUE PRN
Status: DISCONTINUED | OUTPATIENT
Start: 2023-03-07 | End: 2023-03-07 | Stop reason: SURG

## 2023-03-07 RX ORDER — CELECOXIB 200 MG/1
400 CAPSULE ORAL ONCE
Status: COMPLETED | OUTPATIENT
Start: 2023-03-07 | End: 2023-03-07

## 2023-03-07 RX ORDER — ONDANSETRON 2 MG/ML
4 INJECTION INTRAMUSCULAR; INTRAVENOUS
Status: DISCONTINUED | OUTPATIENT
Start: 2023-03-07 | End: 2023-03-07 | Stop reason: HOSPADM

## 2023-03-07 RX ORDER — MEPERIDINE HYDROCHLORIDE 25 MG/ML
12.5 INJECTION INTRAMUSCULAR; INTRAVENOUS; SUBCUTANEOUS
Status: DISCONTINUED | OUTPATIENT
Start: 2023-03-07 | End: 2023-03-07 | Stop reason: HOSPADM

## 2023-03-07 RX ORDER — OXYCODONE HCL 5 MG/5 ML
10 SOLUTION, ORAL ORAL
Status: DISCONTINUED | OUTPATIENT
Start: 2023-03-07 | End: 2023-03-07 | Stop reason: HOSPADM

## 2023-03-07 RX ORDER — EPHEDRINE SULFATE 50 MG/ML
5 INJECTION, SOLUTION INTRAVENOUS
Status: DISCONTINUED | OUTPATIENT
Start: 2023-03-07 | End: 2023-03-07 | Stop reason: HOSPADM

## 2023-03-07 RX ORDER — OXYCODONE HYDROCHLORIDE AND ACETAMINOPHEN 5; 325 MG/1; MG/1
1 TABLET ORAL EVERY 4 HOURS PRN
Qty: 20 TABLET | Refills: 0 | Status: SHIPPED | OUTPATIENT
Start: 2023-03-07 | End: 2023-03-14

## 2023-03-07 RX ORDER — ALBUTEROL SULFATE 2.5 MG/3ML
2.5 SOLUTION RESPIRATORY (INHALATION)
Status: DISCONTINUED | OUTPATIENT
Start: 2023-03-07 | End: 2023-03-07 | Stop reason: HOSPADM

## 2023-03-07 RX ORDER — SODIUM CHLORIDE, SODIUM LACTATE, POTASSIUM CHLORIDE, CALCIUM CHLORIDE 600; 310; 30; 20 MG/100ML; MG/100ML; MG/100ML; MG/100ML
INJECTION, SOLUTION INTRAVENOUS CONTINUOUS
Status: DISCONTINUED | OUTPATIENT
Start: 2023-03-07 | End: 2023-03-07 | Stop reason: HOSPADM

## 2023-03-07 RX ORDER — GLYCOPYRROLATE 0.2 MG/ML
INJECTION INTRAMUSCULAR; INTRAVENOUS PRN
Status: DISCONTINUED | OUTPATIENT
Start: 2023-03-07 | End: 2023-03-07 | Stop reason: SURG

## 2023-03-07 RX ORDER — ACETIC ACID 3 %
LIQUID (ML) MISCELLANEOUS
Status: DISCONTINUED | OUTPATIENT
Start: 2023-03-07 | End: 2023-03-07 | Stop reason: HOSPADM

## 2023-03-07 RX ORDER — DOLUTEGRAVIR SODIUM AND RILPIVIRINE HYDROCHLORIDE 50; 25 MG/1; MG/1
1 TABLET, FILM COATED ORAL DAILY
COMMUNITY

## 2023-03-07 RX ORDER — HYDROMORPHONE HYDROCHLORIDE 1 MG/ML
0.4 INJECTION, SOLUTION INTRAMUSCULAR; INTRAVENOUS; SUBCUTANEOUS
Status: DISCONTINUED | OUTPATIENT
Start: 2023-03-07 | End: 2023-03-07 | Stop reason: HOSPADM

## 2023-03-07 RX ORDER — HYDROMORPHONE HYDROCHLORIDE 1 MG/ML
0.1 INJECTION, SOLUTION INTRAMUSCULAR; INTRAVENOUS; SUBCUTANEOUS
Status: DISCONTINUED | OUTPATIENT
Start: 2023-03-07 | End: 2023-03-07 | Stop reason: HOSPADM

## 2023-03-07 RX ORDER — DIPHENHYDRAMINE HYDROCHLORIDE 50 MG/ML
12.5 INJECTION INTRAMUSCULAR; INTRAVENOUS
Status: DISCONTINUED | OUTPATIENT
Start: 2023-03-07 | End: 2023-03-07 | Stop reason: HOSPADM

## 2023-03-07 RX ORDER — LABETALOL HYDROCHLORIDE 5 MG/ML
5 INJECTION, SOLUTION INTRAVENOUS
Status: DISCONTINUED | OUTPATIENT
Start: 2023-03-07 | End: 2023-03-07 | Stop reason: HOSPADM

## 2023-03-07 RX ORDER — SODIUM CHLORIDE, SODIUM LACTATE, POTASSIUM CHLORIDE, CALCIUM CHLORIDE 600; 310; 30; 20 MG/100ML; MG/100ML; MG/100ML; MG/100ML
INJECTION, SOLUTION INTRAVENOUS
Status: DISCONTINUED | OUTPATIENT
Start: 2023-03-07 | End: 2023-03-07 | Stop reason: SURG

## 2023-03-07 RX ORDER — OXYCODONE HCL 10 MG/1
10 TABLET, FILM COATED, EXTENDED RELEASE ORAL ONCE
Status: COMPLETED | OUTPATIENT
Start: 2023-03-07 | End: 2023-03-07

## 2023-03-07 RX ORDER — CEFAZOLIN SODIUM 1 G/3ML
INJECTION, POWDER, FOR SOLUTION INTRAMUSCULAR; INTRAVENOUS PRN
Status: DISCONTINUED | OUTPATIENT
Start: 2023-03-07 | End: 2023-03-07 | Stop reason: SURG

## 2023-03-07 RX ORDER — OXYCODONE HCL 5 MG/5 ML
5 SOLUTION, ORAL ORAL
Status: DISCONTINUED | OUTPATIENT
Start: 2023-03-07 | End: 2023-03-07 | Stop reason: HOSPADM

## 2023-03-07 RX ORDER — HYDROMORPHONE HYDROCHLORIDE 1 MG/ML
0.2 INJECTION, SOLUTION INTRAMUSCULAR; INTRAVENOUS; SUBCUTANEOUS
Status: DISCONTINUED | OUTPATIENT
Start: 2023-03-07 | End: 2023-03-07 | Stop reason: HOSPADM

## 2023-03-07 RX ORDER — ACETAMINOPHEN 500 MG
1000 TABLET ORAL ONCE
Status: COMPLETED | OUTPATIENT
Start: 2023-03-07 | End: 2023-03-07

## 2023-03-07 RX ORDER — HYDRALAZINE HYDROCHLORIDE 20 MG/ML
5 INJECTION INTRAMUSCULAR; INTRAVENOUS
Status: DISCONTINUED | OUTPATIENT
Start: 2023-03-07 | End: 2023-03-07 | Stop reason: HOSPADM

## 2023-03-07 RX ORDER — ROCURONIUM BROMIDE 10 MG/ML
INJECTION, SOLUTION INTRAVENOUS PRN
Status: DISCONTINUED | OUTPATIENT
Start: 2023-03-07 | End: 2023-03-07 | Stop reason: SURG

## 2023-03-07 RX ORDER — METOPROLOL TARTRATE 1 MG/ML
1 INJECTION, SOLUTION INTRAVENOUS
Status: DISCONTINUED | OUTPATIENT
Start: 2023-03-07 | End: 2023-03-07 | Stop reason: HOSPADM

## 2023-03-07 RX ORDER — LIDOCAINE HYDROCHLORIDE 20 MG/ML
INJECTION, SOLUTION EPIDURAL; INFILTRATION; INTRACAUDAL; PERINEURAL PRN
Status: DISCONTINUED | OUTPATIENT
Start: 2023-03-07 | End: 2023-03-07 | Stop reason: SURG

## 2023-03-07 RX ORDER — HALOPERIDOL 5 MG/ML
1 INJECTION INTRAMUSCULAR
Status: DISCONTINUED | OUTPATIENT
Start: 2023-03-07 | End: 2023-03-07 | Stop reason: HOSPADM

## 2023-03-07 RX ORDER — ONDANSETRON 2 MG/ML
INJECTION INTRAMUSCULAR; INTRAVENOUS PRN
Status: DISCONTINUED | OUTPATIENT
Start: 2023-03-07 | End: 2023-03-07 | Stop reason: SURG

## 2023-03-07 RX ADMIN — ROCURONIUM BROMIDE 50 MG: 10 INJECTION, SOLUTION INTRAVENOUS at 15:36

## 2023-03-07 RX ADMIN — LIDOCAINE HYDROCHLORIDE 100 MG: 20 INJECTION, SOLUTION EPIDURAL; INFILTRATION; INTRACAUDAL at 15:36

## 2023-03-07 RX ADMIN — CEFAZOLIN 2 G: 330 INJECTION, POWDER, FOR SOLUTION INTRAMUSCULAR; INTRAVENOUS at 15:39

## 2023-03-07 RX ADMIN — PROPOFOL 140 MG: 10 INJECTION, EMULSION INTRAVENOUS at 15:36

## 2023-03-07 RX ADMIN — SUGAMMADEX 200 MG: 100 INJECTION, SOLUTION INTRAVENOUS at 16:02

## 2023-03-07 RX ADMIN — SODIUM CHLORIDE, POTASSIUM CHLORIDE, SODIUM LACTATE AND CALCIUM CHLORIDE: 600; 310; 30; 20 INJECTION, SOLUTION INTRAVENOUS at 12:49

## 2023-03-07 RX ADMIN — ONDANSETRON 4 MG: 2 INJECTION INTRAMUSCULAR; INTRAVENOUS at 15:36

## 2023-03-07 RX ADMIN — OXYCODONE HYDROCHLORIDE 10 MG: 10 TABLET, FILM COATED, EXTENDED RELEASE ORAL at 13:00

## 2023-03-07 RX ADMIN — SODIUM CHLORIDE, POTASSIUM CHLORIDE, SODIUM LACTATE AND CALCIUM CHLORIDE: 600; 310; 30; 20 INJECTION, SOLUTION INTRAVENOUS at 15:28

## 2023-03-07 RX ADMIN — GLYCOPYRROLATE 0.1 MG: 0.2 INJECTION INTRAMUSCULAR; INTRAVENOUS at 15:34

## 2023-03-07 RX ADMIN — CELECOXIB 400 MG: 200 CAPSULE ORAL at 13:00

## 2023-03-07 RX ADMIN — FENTANYL CITRATE 50 MCG: 50 INJECTION, SOLUTION INTRAMUSCULAR; INTRAVENOUS at 15:43

## 2023-03-07 RX ADMIN — FENTANYL CITRATE 50 MCG: 50 INJECTION, SOLUTION INTRAMUSCULAR; INTRAVENOUS at 15:58

## 2023-03-07 RX ADMIN — ACETAMINOPHEN 1000 MG: 500 TABLET, FILM COATED ORAL at 13:00

## 2023-03-07 RX ADMIN — DEXAMETHASONE SODIUM PHOSPHATE 8 MG: 4 INJECTION, SOLUTION INTRA-ARTICULAR; INTRALESIONAL; INTRAMUSCULAR; INTRAVENOUS; SOFT TISSUE at 15:36

## 2023-03-07 ASSESSMENT — PAIN SCALES - GENERAL: PAIN_LEVEL: 2

## 2023-03-07 ASSESSMENT — PAIN DESCRIPTION - PAIN TYPE
TYPE: SURGICAL PAIN

## 2023-03-07 NOTE — ANESTHESIA PROCEDURE NOTES
Airway    Date/Time: 3/7/2023 3:37 PM  Performed by: Musa Redding M.D.  Authorized by: Musa Redding M.D.     Location:  OR  Urgency:  Elective  Indications for Airway Management:  Anesthesia      Spontaneous Ventilation: absent    Sedation Level:  Deep  Preoxygenated: Yes    Patient Position:  Sniffing  MILS Maintained Throughout: Yes    Mask Difficulty Assessment:  1 - vent by mask  Final Airway Type:  Endotracheal airway  Final Endotracheal Airway:  ETT  Cuffed: Yes    Technique Used for Successful ETT Placement:  Direct laryngoscopy    Insertion Site:  Oral  Blade Type:  Thompson  Laryngoscope Blade/Videolaryngoscope Blade Size:  2  ETT Size (mm):  7.5  Measured from:  Lips  ETT to Lips (cm):  21  Placement Verified by: capnometry    Cormack-Lehane Classification:  Grade I - full view of glottis  Number of Attempts at Approach:  1

## 2023-03-07 NOTE — ANESTHESIA PREPROCEDURE EVALUATION
Case: [id number] Date/Time: 03/07/23 1445    Procedure: HIGH RESOLUTION ANOSCOPY    Pre-op diagnosis: Abnormal Anal Pap Smear    Location: [facility] OR 11 / SURGERY [facility]    Surgeons: Rosalio Gross M.D.      27yoM with asthma, HIV, hemorrhoids    NKDA  NPO  No Ac    Relevant Problems   PULMONARY   (positive) Mild intermittent asthma without complication      CARDIAC   (positive) Hemorrhoids       Physical Exam    Airway   Mallampati: II       Cardiovascular - normal exam     Dental - normal exam           Pulmonary - normal exam     Abdominal - normal exam     Neurological              Anesthesia Plan    ASA 2       Plan - general       Airway plan will be ETT          Induction: intravenous    Postoperative Plan: Postoperative administration of opioids is intended.    Pertinent diagnostic labs and testing reviewed    Informed Consent:    Anesthetic plan and risks discussed with patient.

## 2023-03-08 LAB — PATHOLOGY CONSULT NOTE: NORMAL

## 2023-03-08 NOTE — DISCHARGE INSTRUCTIONS
HOME CARE INSTRUCTIONS    ACTIVITY: Rest and take it easy for the first 24 hours.  A responsible adult is recommended to remain with you during that time.  It is normal to feel sleepy.  We encourage you to not do anything that requires balance, judgment or coordination.    FOR 24 HOURS DO NOT:  Drive, operate machinery or run household appliances.  Drink beer or alcoholic beverages.  Make important decisions or sign legal documents.    SPECIAL INSTRUCTIONS:     What to Expect   You have just had a high-resolution anoscopy (HRA), which might have included a biopsy. You might feel  like you need to move your bowels - this is from the pressure of the anoscope going into your anus, and it  is normal. Try not to move your bowels for 1 hour and the feeling will usually pass. If you use the  bathroom, you might notice a little bleeding - this is also normal.   If you have had biopsy or treatment, you will feel a gauze pad at the opening of your anus. Leave that in  place for about 1 hour and then remove it. If you've had a biopsy or treatment outside your anus, you  might need to keep a tissue/gauze against your wound for several days to prevent soiling your underwear.  Bleeding   For up to 1 week after a biopsy (or up to 2 weeks after treatment), you may have a little bleeding with your  bowel movements. If you have heavy bleeding or bleeding that does not stop quickly after bowel  movements, call this office immediately at [phone] Dr Gross, or go to the nearest emergency room.    Pain Control   You might experience a little pain after the procedure. If you have only had a biopsy, the pain usually  resolves quickly. If you are not allergic, try acetaminophen (regular or extra strength Tylenol®) as needed.  Follow the instructions on the label. Do not take more medication, or take it more often, than instructed.   Prescription strength medication is sometimes necessary, especially after a treatment. Only use  the  medication when necessary and be sure to follow all directions. Throw away any unused medications. If  your pain is severe and not relieved by medication, call the office at [phone]    Restrictions   Nothing goes into the anus until bleeding has stopped and you are free of pain.   Do not drive or perform potentially dangerous activities until you have recovered from anesthesia and  have stopped taking any narcotic pain medication.   You may resume other activities, but avoid strenuous exercise for 1 day.  Follow-up  While we may have treated an area today, HPV can come back and cause further problems. You may not need  more treatment now, but there is a chance that a problem could develop in the future. Please keep all followup appointments, including your postop appointment in 3-4 weeks. This will ensure that you have healed  properly and that nothing else has developed.     DIET: To avoid nausea, slowly advance diet as tolerated, avoiding spicy or greasy foods for the first day.  Add more substantial food to your diet according to your physician's instructions.  Babies can be fed formula or breast milk as soon as they are hungry.  INCREASE FLUIDS AND FIBER TO AVOID CONSTIPATION.    SURGICAL DRESSING/BATHING: no dressing to maintain    MEDICATIONS: Resume taking daily medication.  Take prescribed pain medication with food.  If no medication is prescribed, you may take non-aspirin pain medication if needed.  PAIN MEDICATION CAN BE VERY CONSTIPATING.  Take a stool softener or laxative such as senokot, pericolace, or milk of magnesia if needed.    Prescription given for percocet.  Last pain medication given at 1 pm.    A follow-up appointment should be arranged with your doctor in 2-4 weeks; call to schedule.    You should CALL YOUR PHYSICIAN if you develop:  Fever greater than 101 degrees F.  Pain not relieved by medication, or persistent nausea or vomiting.  Excessive bleeding (blood soaking through  dressing) or unexpected drainage from the wound.  Extreme redness or swelling around the incision site, drainage of pus or foul smelling drainage.  Inability to urinate or empty your bladder within 8 hours.  Problems with breathing or chest pain.    You should call 911 if you develop problems with breathing or chest pain.  If you are unable to contact your doctor or surgical center, you should go to the nearest emergency room or urgent care center.  Physician's telephone #: [phone] Dr Gross    MILD FLU-LIKE SYMPTOMS ARE NORMAL.  YOU MAY EXPERIENCE GENERALIZED MUSCLE ACHES, THROAT IRRITATION, HEADACHE AND/OR SOME NAUSEA.    If any questions arise, call your doctor.  If your doctor is not available, please feel free to call the Surgical Center at [phone].  The Center is open Monday through Friday from 7AM to 7PM.      A registered nurse may call you a few days after your surgery to see how you are doing after your procedure.    You may also receive a survey in the mail within the next two weeks and we ask that you take a few moments to complete the survey and return it to us.  Our goal is to provide you with very good care and we value your comments.     Depression / Suicide Risk    As you are discharged from this [facility] facility, it is important to learn how to keep safe from harming yourself.    Recognize the warning signs:  Abrupt changes in personality, positive or negative- including increase in energy   Giving away possessions  Change in eating patterns- significant weight changes-  positive or negative  Change in sleeping patterns- unable to sleep or sleeping all the time   Unwillingness or inability to communicate  Depression  Unusual sadness, discouragement and loneliness  Talk of wanting to die  Neglect of personal appearance   Rebelliousness- reckless behavior  Withdrawal from people/activities they love  Confusion- inability to concentrate     If you or a loved one observes any of these  behaviors or has concerns about self-harm, here's what you can do:  Talk about it- your feelings and reasons for harming yourself  Remove any means that you might use to hurt yourself (examples: pills, rope, extension cords, firearm)  Get professional help from the community (Mental Health, Substance Abuse, psychological counseling)  Do not be alone:Call your Safe Contact- someone whom you trust who will be there for you.  Call your local CRISIS HOTLINE 347-3794 or [phone]  Call your local Children's Mobile Crisis Response Team [place] (709) 994-4612 or www.GreenButton  Call the toll free National Suicide Prevention Hotlines   National Suicide Prevention Lifeline 707-148-VXCQ (5935)  National Hope Line Network 800-SUICIDE (144-3026)    I acknowledge receipt and understanding of these Home Care instructions.

## 2023-03-08 NOTE — OR NURSING
"1611 Patient arrived to PACU from OR.  Report from anesthesia and RN.  Patient has oral airway in place.  1612 Oral airway removed.  Patient reports feeling \"fuzzy.\"  1630 No drainage noted from rectum, patient reports tolerable pain.  1635 Cyndi Read called and updated on patient status.  1700 Patient sitting up to edge of bed,  tolerating oral intake.  Reports tolerable pain.  No bleeding noted.  1720 Report called to Ashlie RN.    1725 Patient transferred to Phase 2.   "

## 2023-03-08 NOTE — ANESTHESIA POSTPROCEDURE EVALUATION
Patient: Marcos Feldman    Procedure Summary     Date: 03/07/23 Room / Location: [facility] 11 / SURGERY [facility]    Anesthesia Start: 1528 Anesthesia Stop: 1615    Procedure: HIGH RESOLUTION ANOSCOPY (Anus) Diagnosis: (Abnormal Anal Pap Smear)    Surgeons: Rosalio Gross M.D. Responsible Provider: Musa Redding M.D.    Anesthesia Type: general ASA Status: 2          Final Anesthesia Type: general  Last vitals  BP   Blood Pressure: (!) 143/84    Temp   (P) 36.2 °C (97.2 °F)    Pulse   89   Resp   16    SpO2   93 %      Anesthesia Post Evaluation    Patient location during evaluation: PACU  Patient participation: complete - patient participated  Level of consciousness: awake and alert  Pain score: 2    Airway patency: patent  Anesthetic complications: no  Cardiovascular status: adequate and hemodynamically stable  Respiratory status: acceptable  Hydration status: acceptable    PONV: none          No notable events documented.

## 2023-03-08 NOTE — OP REPORT
DATE OF SERVICE:  03/07/2023     PREOPERATIVE DIAGNOSES:  Anal cells of unclear significance on abnormal anal   Pap smear.     POSTOPERATIVE DIAGNOSES:  Anal cells of unclear significance on abnormal anal   Pap smear.     PROCEDURE:  High resolution anoscopy with biopsies.     SURGEON:  Rosalio Gross MD     ASSISTANT:  None.     ANESTHESIA:  General endotracheal anesthesia.     ANESTHESIOLOGIST:  Musa Redding MD     ESTIMATED BLOOD LOSS:  Less than 5 mL     SPECIMENS:  Posterior and right lateral anal canal biopsies.     COMPLICATIONS:  None.     CONDITION:  Stable.     INDICATIONS FOR PROCEDURE:  This is a 27-year-old male who presents with   abnormal anal Pap smear.  Risks, benefits, and alternatives of high resolution   anoscopy were explained to him before proceeding.     OPERATIVE FINDINGS:  Positive staining lesions in the posterior and right   lateral anal canal biopsied with hemostasis.     OPERATIVE TECHNIQUE:  After informed consent was obtained, the patient was   taken to the operating room and placed in the supine position.  After adequate   endotracheal anesthesia was achieved she was switched to the prone position.    The table was jackknifed and buttocks taped apart.  We then used an operating   microscope to visualize the anal canal.  We then placed acetic acid within   the anal canal and then stained all surfaces with Lugol solution.  We then   evaluated the anal canal and found 2 areas of uptake staining in the posterior   midline and right lateral anal canal.  These were biopsied with cautery.  The   rest of the anal canal was examined.  No other areas were noted.  Some   bleeding areas were cauterized as well.  The procedure was then concluded and   the local anesthetic block was given with 30 mL of 0.5% Marcaine with   epinephrine.  The patient was returned to the PACU in stable condition.  All   instrument counts were correct at the end of the procedure.            ______________________________  MD INDIRA Maguire/RYAN    DD:  03/07/2023 16:29  DT:  03/07/2023 19:01    Job#:  [id number]

## 2023-04-25 ENCOUNTER — APPOINTMENT (OUTPATIENT)
Dept: NEPHROLOGY | Facility: MEDICAL CENTER | Age: 28
End: 2023-04-25
Payer: COMMERCIAL

## 2023-04-28 PROBLEM — R85.619 ABNORMAL ANAL PAPANICOLAOU SMEAR: Status: ACTIVE | Noted: 2023-01-19

## 2023-04-28 PROBLEM — N18.1 CKD (CHRONIC KIDNEY DISEASE) STAGE 1, GFR 90 ML/MIN OR GREATER: Status: ACTIVE | Noted: 2023-04-28

## 2024-02-16 ENCOUNTER — PATIENT MESSAGE (OUTPATIENT)
Dept: HEALTH INFORMATION MANAGEMENT | Facility: OTHER | Age: 29
End: 2024-02-16

## (undated) DEVICE — GLOVE BIOGEL PI INDICATOR SZ 7.5 SURGICAL PF LF -(50/BX 4BX/CA)

## (undated) DEVICE — SENSOR OXIMETER ADULT SPO2 RD SET (20EA/BX)

## (undated) DEVICE — SET EXTENSION WITH 2 PORTS (48EA/CA) ***PART #2C8610 IS A SUBSTITUTE*****

## (undated) DEVICE — COVER LIGHT HANDLE ALC PLUS DISP (18EA/BX)

## (undated) DEVICE — BOVIE NEEDLE TIP 3CM COLORADO

## (undated) DEVICE — TUBING CLEARLINK DUO-VENT - C-FLO (48EA/CA)

## (undated) DEVICE — ARMREST CRADLE FOAM - (2PR/PK 12PR/CA)

## (undated) DEVICE — CANISTER SUCTION 3000ML MECHANICAL FILTER AUTO SHUTOFF MEDI-VAC NONSTERILE LF DISP  (40EA/CA)

## (undated) DEVICE — GLOVE BIOGEL ECLIPSE PF LATEX SIZE 7.5

## (undated) DEVICE — LACTATED RINGERS INJ 1000 ML - (14EA/CA 60CA/PF)

## (undated) DEVICE — WATER IRRIGATION STERILE 1000ML (12EA/CA)

## (undated) DEVICE — GLOVE BIOGEL PI INDICATOR SZ 6.5 SURGICAL PF LF - (50/BX 4BX/CA)

## (undated) DEVICE — BAG SPONGE COUNT 10.25 X 32 - BLUE (250/CA)

## (undated) DEVICE — APPLICATOR COTTON TIP 6 IN - STERILE (2EA/PK 100PK/BX)

## (undated) DEVICE — PACK MINOR BASIN - (2EA/CA)

## (undated) DEVICE — DRAPE LAPAROTOMY T SHEET - (12EA/CA)

## (undated) DEVICE — SUCTION INSTRUMENT YANKAUER BULBOUS TIP W/O VENT (50EA/CA)

## (undated) DEVICE — CONTAINER SPECIMEN BAG OR - STERILE 4 OZ W/LID (100EA/CA)

## (undated) DEVICE — SPONGE GAUZE NON-STERILE 4X4 - (2000/CA 10PK/CA)

## (undated) DEVICE — ELECTRODE DUAL RETURN W/ CORD - (50/PK)